# Patient Record
Sex: MALE | Race: WHITE | Employment: OTHER | URBAN - NONMETROPOLITAN AREA
[De-identification: names, ages, dates, MRNs, and addresses within clinical notes are randomized per-mention and may not be internally consistent; named-entity substitution may affect disease eponyms.]

---

## 2017-01-31 ENCOUNTER — HOSPITAL ENCOUNTER (EMERGENCY)
Facility: HOSPITAL | Age: 82
Discharge: HOME | End: 2017-01-31
Attending: PHYSICIAN ASSISTANT | Admitting: PHYSICIAN ASSISTANT
Payer: MEDICARE

## 2017-01-31 VITALS
HEART RATE: 77 BPM | HEIGHT: 72 IN | OXYGEN SATURATION: 93 % | TEMPERATURE: 100.2 F | SYSTOLIC BLOOD PRESSURE: 134 MMHG | RESPIRATION RATE: 16 BRPM | DIASTOLIC BLOOD PRESSURE: 73 MMHG

## 2017-01-31 DIAGNOSIS — J18.9 COMMUNITY ACQUIRED PNEUMONIA: Primary | ICD-10-CM

## 2017-01-31 LAB
ALBUMIN SERPL-MCNC: 3.2 G/DL (ref 3.4–5)
ALBUMIN UR-MCNC: 10 MG/DL
ALP SERPL-CCNC: 75 U/L (ref 40–150)
ALT SERPL W P-5'-P-CCNC: 18 U/L (ref 0–70)
ANION GAP SERPL CALCULATED.3IONS-SCNC: 8 MMOL/L (ref 3–14)
APPEARANCE UR: CLEAR
AST SERPL W P-5'-P-CCNC: 14 U/L (ref 0–45)
BASOPHILS # BLD AUTO: 0 10E9/L (ref 0–0.2)
BASOPHILS NFR BLD AUTO: 0.4 %
BILIRUB SERPL-MCNC: 0.4 MG/DL (ref 0.2–1.3)
BILIRUB UR QL STRIP: NEGATIVE
BUN SERPL-MCNC: 15 MG/DL (ref 7–30)
CALCIUM SERPL-MCNC: 8.5 MG/DL (ref 8.5–10.1)
CHLORIDE SERPL-SCNC: 105 MMOL/L (ref 94–109)
CO2 SERPL-SCNC: 25 MMOL/L (ref 20–32)
COLOR UR AUTO: YELLOW
CREAT SERPL-MCNC: 1.4 MG/DL (ref 0.66–1.25)
DIFFERENTIAL METHOD BLD: ABNORMAL
EOSINOPHIL # BLD AUTO: 0.1 10E9/L (ref 0–0.7)
EOSINOPHIL NFR BLD AUTO: 1 %
ERYTHROCYTE [DISTWIDTH] IN BLOOD BY AUTOMATED COUNT: 13.2 % (ref 10–15)
FLUAV+FLUBV AG SPEC QL: NEGATIVE
FLUAV+FLUBV AG SPEC QL: NORMAL
GFR SERPL CREATININE-BSD FRML MDRD: 49 ML/MIN/1.7M2
GLUCOSE SERPL-MCNC: 126 MG/DL (ref 70–99)
GLUCOSE UR STRIP-MCNC: NEGATIVE MG/DL
HCT VFR BLD AUTO: 39.9 % (ref 40–53)
HGB BLD-MCNC: 14.1 G/DL (ref 13.3–17.7)
HGB UR QL STRIP: ABNORMAL
IMM GRANULOCYTES # BLD: 0 10E9/L (ref 0–0.4)
IMM GRANULOCYTES NFR BLD: 0.6 %
KETONES UR STRIP-MCNC: NEGATIVE MG/DL
LACTATE SERPL-SCNC: 1.6 MMOL/L (ref 0.4–2)
LEUKOCYTE ESTERASE UR QL STRIP: NEGATIVE
LYMPHOCYTES # BLD AUTO: 0.8 10E9/L (ref 0.8–5.3)
LYMPHOCYTES NFR BLD AUTO: 11.4 %
MCH RBC QN AUTO: 30.5 PG (ref 26.5–33)
MCHC RBC AUTO-ENTMCNC: 35.3 G/DL (ref 31.5–36.5)
MCV RBC AUTO: 86 FL (ref 78–100)
MONOCYTES # BLD AUTO: 0.7 10E9/L (ref 0–1.3)
MONOCYTES NFR BLD AUTO: 10.6 %
MUCOUS THREADS #/AREA URNS LPF: PRESENT /LPF
NEUTROPHILS # BLD AUTO: 5.3 10E9/L (ref 1.6–8.3)
NEUTROPHILS NFR BLD AUTO: 76 %
NITRATE UR QL: NEGATIVE
NRBC # BLD AUTO: 0 10*3/UL
NRBC BLD AUTO-RTO: 0 /100
PH UR STRIP: 5.5 PH (ref 4.7–8)
PLATELET # BLD AUTO: 58 10E9/L (ref 150–450)
POTASSIUM SERPL-SCNC: 3.9 MMOL/L (ref 3.4–5.3)
PROT SERPL-MCNC: 7.3 G/DL (ref 6.8–8.8)
RBC # BLD AUTO: 4.63 10E12/L (ref 4.4–5.9)
RBC #/AREA URNS AUTO: 3 /HPF (ref 0–2)
SODIUM SERPL-SCNC: 138 MMOL/L (ref 133–144)
SP GR UR STRIP: 1.01 (ref 1–1.03)
SPECIMEN SOURCE: NORMAL
URN SPEC COLLECT METH UR: ABNORMAL
UROBILINOGEN UR STRIP-MCNC: NORMAL MG/DL (ref 0–2)
WBC # BLD AUTO: 7 10E9/L (ref 4–11)
WBC #/AREA URNS AUTO: <1 /HPF (ref 0–2)

## 2017-01-31 PROCEDURE — 80053 COMPREHEN METABOLIC PANEL: CPT | Performed by: PHYSICIAN ASSISTANT

## 2017-01-31 PROCEDURE — 85025 COMPLETE CBC W/AUTO DIFF WBC: CPT | Performed by: PHYSICIAN ASSISTANT

## 2017-01-31 PROCEDURE — 81001 URINALYSIS AUTO W/SCOPE: CPT | Performed by: PHYSICIAN ASSISTANT

## 2017-01-31 PROCEDURE — 83605 ASSAY OF LACTIC ACID: CPT | Performed by: PHYSICIAN ASSISTANT

## 2017-01-31 PROCEDURE — 99284 EMERGENCY DEPT VISIT MOD MDM: CPT | Performed by: PHYSICIAN ASSISTANT

## 2017-01-31 PROCEDURE — 36415 COLL VENOUS BLD VENIPUNCTURE: CPT | Performed by: PHYSICIAN ASSISTANT

## 2017-01-31 PROCEDURE — 99284 EMERGENCY DEPT VISIT MOD MDM: CPT | Mod: 25

## 2017-01-31 PROCEDURE — 87040 BLOOD CULTURE FOR BACTERIA: CPT | Performed by: PHYSICIAN ASSISTANT

## 2017-01-31 PROCEDURE — 87804 INFLUENZA ASSAY W/OPTIC: CPT | Mod: 59 | Performed by: FAMILY MEDICINE

## 2017-01-31 PROCEDURE — 71010 ZZHC CHEST ONE VIEW: CPT | Mod: TC

## 2017-01-31 RX ORDER — ALBUTEROL SULFATE 90 UG/1
2 AEROSOL, METERED RESPIRATORY (INHALATION) EVERY 4 HOURS PRN
Qty: 1 INHALER | Refills: 0 | Status: SHIPPED | OUTPATIENT
Start: 2017-01-31 | End: 2018-04-16

## 2017-01-31 RX ORDER — LEVOFLOXACIN 500 MG/1
500 TABLET, FILM COATED ORAL DAILY
Qty: 7 TABLET | Refills: 0 | Status: SHIPPED | OUTPATIENT
Start: 2017-01-31 | End: 2017-02-07

## 2017-01-31 ASSESSMENT — ENCOUNTER SYMPTOMS
FEVER: 1
NAUSEA: 0
ABDOMINAL PAIN: 0
CHILLS: 0
SHORTNESS OF BREATH: 0
SORE THROAT: 0
COUGH: 1
CHEST TIGHTNESS: 0
WHEEZING: 0
VOMITING: 0

## 2017-01-31 NOTE — ED NOTES
81 year old male presents  To ER with c/o fever, shaking and cough with increased symptom starting yesterday.  Did take ibuprofen PTA, also reports episodes of having episodes of feeling flushed earlier today. See assessments.  Denies pain when asked. Accompanied by wife. Call light within reach.

## 2017-01-31 NOTE — LETTER
[facility]  [address]  [place] [address]  [phone]    2017    Gino Donaldson  [address]   [place] [address]  [phone] (home)     : 1935      To Whom it may concern:    Gino Donaldson was seen in our Emergency Department today, 2017. No work for 5 days ago.     Sincerely,        Maribel Ocampo PA-C

## 2017-01-31 NOTE — ED AVS SNAPSHOT
[facility]    [address]    [place] [address]    Phone:  [phone]                                       Gino Donaldson   MRN: [id number]    Department:  [facility]   Date of Visit:  1/31/2017           After Visit Summary Signature Page     I have received my discharge instructions, and my questions have been answered. I have discussed any challenges I see with this plan with the nurse or doctor.    ..........................................................................................................................................  Patient/Patient Representative Signature      ..........................................................................................................................................  Patient Representative Print Name and Relationship to Patient    ..................................................               ................................................  Date                                            Time    ..........................................................................................................................................  Reviewed by Signature/Title    ...................................................              ..............................................  Date                                                            Time

## 2017-02-01 NOTE — ED NOTES
Discharge instructions reviewed with patient, and Rx sent to [facility]. Pt verbalized understanding. Pt ambulated with a steady gait to the exit.

## 2017-02-01 NOTE — ED NOTES
Face to face report given with opportunity to observe patient.    Report given to Brittanie BONILLA   1/31/2017  7:11 PM

## 2017-02-01 NOTE — PROGRESS NOTES
Quick Note:    Xray report routed to patient's PCP, Dr. Velásquez. Patient started on Levaquin prior to discharge. Patient instructed to f/u in 3-5 days.  ______

## 2017-02-01 NOTE — PLAN OF CARE
Pt called to request a different antibiotic that he was given after he read the potential side affects of Levaquin.  Spoke with DR. Fang and explained pt's and wife's concerns. Reassured pt/wife that only 10% of people have the side affects and risks of not taking the medicine were greater than the risk of taking the levaquin. Discussed if he noted any of the side affects to stop the medication and return to ED or F/U with his provider and to follow up if he is not getting better. Pt and wife verbalized understanding.

## 2017-02-01 NOTE — ED PROVIDER NOTES
"  History     Chief Complaint   Patient presents with     Fever     fever and cough since last night. temp 101.8 PTA. Had a flu shot     HPI  Gino Donaldson is a 81 year old male who presents with cough and fever since last night. Had a temp of 101.8 at 1530 today and took Ibuprpofen 800mg for it. His cough has been productive. Had his flu shot. Has h/o pneumonia 1 year ago per wife. 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    I have reviewed the Medications, Allergies, Past Medical and Surgical History, and Social History in the Epic system.    Review of Systems   Constitutional: Positive for fever. Negative for chills.   HENT: Negative for congestion and sore throat.    Respiratory: Positive for cough. Negative for chest tightness, shortness of breath and wheezing.    Cardiovascular: Negative for chest pain.   Gastrointestinal: Negative for nausea, vomiting and abdominal pain.   Skin: Negative.  Negative for rash.   All other systems reviewed and are negative.    Past Medical History:   Past Medical History   Diagnosis Date     Hypercholesteraemia      Nocturia      Tobacco abuse, in remission        History reviewed. No pertinent past surgical history.    Social History     Social History     Marital Status:      Spouse Name: N/A     Number of Children: N/A     Years of Education: N/A     Occupational History     Not on file.     Social History Main Topics     Smoking status: Former Smoker     Types: Cigarettes     Smokeless tobacco: Never Used     Alcohol Use: Yes     Drug Use: No     Sexual Activity: Not on file     Other Topics Concern     Not on file     Social History Narrative       Discharge Medication List as of 1/31/2017  7:10 PM      START taking these medications    Details   levofloxacin (LEVAQUIN) 500 MG tablet Take 1 tablet (500 mg) by mouth daily for 7 days, Disp-7 tablet, R-0, E-Prescribe      albuterol " (ALBUTEROL) 108 (90 BASE) MCG/ACT Inhaler Inhale 2 puffs into the lungs every 4 hours as needed for shortness of breath / dyspnea, Disp-1 Inhaler, R-0, E-Prescribe         CONTINUE these medications which have NOT CHANGED    Details   ASPIRIN ADULT LOW STRENGTH PO Take 81 mg by mouth, Historical             Allergies: Review of patient's allergies indicates no known allergies.    Physical Exam   BP: 130/68 mmHg  Pulse: 91  Heart Rate: 79  Temp: 99.8  F (37.7  C)  Resp: 20  Height: 182.9 cm (6')  SpO2: 92 %  Physical Exam   Constitutional: He is oriented to person, place, and time. He appears well-developed and well-nourished.  Non-toxic appearance. He does not have a sickly appearance. He does not appear ill. No distress.   HENT:   Head: Normocephalic and atraumatic.   Right Ear: External ear normal.   Nose: Nose normal.   Mouth/Throat: Oropharynx is clear and moist. No oropharyngeal exudate.   Eyes: Conjunctivae and EOM are normal. Pupils are equal, round, and reactive to light. Right eye exhibits no discharge. Left eye exhibits no discharge. No scleral icterus.   Neck: Normal range of motion. Neck supple. No JVD present.   Cardiovascular: Normal rate, regular rhythm, normal heart sounds and intact distal pulses.  Exam reveals no gallop and no friction rub.    No murmur heard.  Pulmonary/Chest: Effort normal and breath sounds normal. No respiratory distress. He has no wheezes. He has no rales. He exhibits no tenderness.   Crackles RLL.    Abdominal: There is no tenderness.   Musculoskeletal: Normal range of motion. He exhibits no edema.   Lymphadenopathy:     He has no cervical adenopathy.   Neurological: He is alert and oriented to person, place, and time. No cranial nerve deficit. Coordination normal.   Skin: Skin is warm and dry. No rash noted. He is not diaphoretic. No erythema. No pallor.   Psychiatric: He has a normal mood and affect. His behavior is normal. Judgment and thought content normal.   Nursing note  and vitals reviewed.      ED Course   Procedures             Labs Ordered and Resulted from Time of ED Arrival Up to the Time of Departure from the ED   CBC WITH PLATELETS DIFFERENTIAL - Abnormal; Notable for the following:     Hematocrit 39.9 (*)     Platelet Count 58 (*)     All other components within normal limits   UA MACROSCOPIC WITH REFLEX TO MICRO AND CULTURE - Abnormal; Notable for the following:     Blood Urine Trace (*)     Protein Albumin Urine 10 (*)     RBC Urine 3 (*)     Mucous Urine Present (*)     All other components within normal limits   COMPREHENSIVE METABOLIC PANEL - Abnormal; Notable for the following:     Glucose 126 (*)     Creatinine 1.40 (*)     GFR Estimate 49 (*)     GFR Estimate If Black 59 (*)     Albumin 3.2 (*)     All other components within normal limits   LACTIC ACID   VITAL SIGNS   PULSE OXIMETRY NURSING   PERIPHERAL IV CATHETER   INFLUENZA A/B ANTIGEN   BLOOD CULTURE   BLOOD CULTURE       Assessments & Plan (with Medical Decision Making)   Gino is well appearing and VS are WNL other than low grade temp of 100.2. No respiratory distress. Faint crackles to his RLL on exam. CXR shows very subtle infiltrate to the RLL. Given cough and fevers, will treat for pneumonia with Levaquin 500mg daily x 7 days and albuterol inhaler PRN coughing, dyspnea. Labs are largely unremarkable other than low platelets at 58 with h/o this and no signs of bleeding today. He was discharged home with his wife in stable condition.     Plan: Increase fluids and rest. Alternate between tylenol 650mg and Ibuprofen 600mg every 4 hours for fever control. Take the Levaquin as prescribed for your pneumonia. Use the albuterol inhaler every 4 hours as needed for wheezing, coughing, or shortness of breath. Return here with ANY new or worsening symptoms as discussed. Follow up with Dr. Velásquez on Monday for re-check. No work for 5 days.     I have reviewed the nursing notes.    I have reviewed the findings,  diagnosis, plan and need for follow up with the patient.    Discharge Medication List as of 1/31/2017  7:10 PM      START taking these medications    Details   levofloxacin (LEVAQUIN) 500 MG tablet Take 1 tablet (500 mg) by mouth daily for 7 days, Disp-7 tablet, R-0, E-Prescribe      albuterol (ALBUTEROL) 108 (90 BASE) MCG/ACT Inhaler Inhale 2 puffs into the lungs every 4 hours as needed for shortness of breath / dyspnea, Disp-1 Inhaler, R-0, E-Prescribe             Final diagnoses:   Community acquired pneumonia       1/31/2017   [facility]      Maribel Ocampo PA-C  01/31/17 2030

## 2017-02-06 ENCOUNTER — OFFICE VISIT (OUTPATIENT)
Dept: FAMILY MEDICINE | Facility: OTHER | Age: 82
End: 2017-02-06
Attending: FAMILY MEDICINE
Payer: COMMERCIAL

## 2017-02-06 VITALS
WEIGHT: 210 LBS | TEMPERATURE: 97 F | DIASTOLIC BLOOD PRESSURE: 78 MMHG | HEART RATE: 78 BPM | BODY MASS INDEX: 28.47 KG/M2 | SYSTOLIC BLOOD PRESSURE: 139 MMHG | OXYGEN SATURATION: 93 % | RESPIRATION RATE: 17 BRPM

## 2017-02-06 DIAGNOSIS — J18.9 PNEUMONIA OF BOTH LOWER LOBES DUE TO INFECTIOUS ORGANISM: Primary | ICD-10-CM

## 2017-02-06 LAB
BACTERIA SPEC CULT: NORMAL
BACTERIA SPEC CULT: NORMAL
Lab: NORMAL
Lab: NORMAL
MICRO REPORT STATUS: NORMAL
MICRO REPORT STATUS: NORMAL
SPECIMEN SOURCE: NORMAL
SPECIMEN SOURCE: NORMAL

## 2017-02-06 PROCEDURE — 99213 OFFICE O/P EST LOW 20 MIN: CPT | Performed by: FAMILY MEDICINE

## 2017-02-06 PROCEDURE — 99212 OFFICE O/P EST SF 10 MIN: CPT

## 2017-02-06 ASSESSMENT — PAIN SCALES - GENERAL: PAINLEVEL: NO PAIN (0)

## 2017-02-06 NOTE — NURSING NOTE
Chief Complaint   Patient presents with     ER F/U       Initial /78 mmHg  Pulse 78  Temp(Src) 97  F (36.1  C)  Resp 17  Wt 210 lb (95.255 kg)  SpO2 93% Estimated body mass index is 28.47 kg/(m^2) as calculated from the following:    Height as of 1/31/17: 6' (1.829 m).    Weight as of this encounter: 210 lb (95.255 kg).  Medication Reconciliation: complete

## 2017-02-06 NOTE — PATIENT INSTRUCTIONS
Preventing Pneumonia  Pneumonia is an infection in one or both of  the lungs. Those most at risk include the elderly, smokers, people with chronic lung diseases. For example, those with conditions like chronic obstructive pulmonary disease (COPD), including emphysema or asthma, and those with weak immune systems. There are some things you can do to lessen the chance that you will get pneumonia.    Avoid infection    Wash your hands often    Use soap and warm water.    If soap and water aren't available, use a hand  with alcohol in it.    Avoid touching your face and mouth with your hands.    Use disposable tissues instead of a handkerchief. Throw used tissues away.    Avoid people who have a cold or the flu.    Try to avoid crowded places.  Get vaccinated  Talk with your health care provider about vaccinations. You should get a yearly flu shot and at least one pneumococcal shot.    Get a flu vaccine every year as soon as it's available in your area. The flu shot helps prevent you from getting the flu and complications of the flu, such as pneumonia.    Get pneumococcal pneumonia vaccines. Talk with your health care provider about which pneumococcal vaccines are right for you.  Do suggested breathing exercises  Deep breathing and coughing exercises can help clear your lungs. Your health care provider may suggest them. If so, you will be shown how to do them. Do them as often as your health care provider instructs.  Take care of your body    Drink at least  6 to 8 glasses of water a day.    Eat well-balanced, nutritious meals.    Avoid drinking alcohol.    Don t smoke. Avoid places where people are smoking.    Moving around helps keep your lungs clear. Ask your health care provider what type of activity is best for you. Walking is often a good choice.    Get enough rest. Sleep at least 8 hours each night. Rest or nap during the day as needed.    9167-5819 [employer]. [address] [facility],  [place] [address]. All rights reserved. This information is not intended as a substitute for professional medical care. Always follow your healthcare professional's instructions.        Discharge Instructions for Pneumonia  You have been diagnosed with pneumonia, a serious lung infection. Most cases of pneumonia are caused by bacteria. Pneumonia most often occurs in older adults, young children, and people with chronic health problems.  Home care    Take your medication exactly as directed. Don t skip doses. Continue taking your antibiotics as directed until they are all gone -- even if you start to feel better. This will prevent the pneumonia from coming back.    Drink at least 8 glasses of water daily, unless directed otherwise. This helps to loosen and thin secretions so that you can cough them up.    Use a cool-mist humidifier in your bedroom. Be sure to clean the humidifier daily.    Coughing up mucus is normal. Don t use medications to suppress your cough unless your cough is dry, painful, or interferes with your sleep. You may use an expectorant if ordered by your doctor.    Warm compresses or a heating pad on the lowest setting can be used to relieve chest discomfort. Use several times a day for 15 to 20 minutes at a time. (To prevent injuring your skin, be sure the temperature of the compress or heating pad is warm, not hot.)    Get plenty of rest until your fever, shortness of breath, and chest pain go away.    Plan to get a flu shot every year.    Ask your doctor about pneumonia vaccinations.     Follow-up care  Make a follow-up appointment as directed by our staff.     When to seek medical care  Call 911 right away if you have any of the following:    Chest pain    Trouble breathing    Blue lips or fingernails  Otherwise, call your doctor if you have any of the following:    Fever above 101.5 F  (38.6 C)    Yellow, green, bloody, or smelly sputum    More than normal mucus production    Vomiting      5840-9365 [employer]. [address] [place] [address], [place] [address]. All rights reserved. This information is not intended as a substitute for professional medical care. Always follow your healthcare professional's instructions.

## 2017-02-06 NOTE — PROGRESS NOTES
SUBJECTIVE:                                                    Gino Donaldson is a 81 year old male who presents to clinic today for the following health issues:      ED/UC Followup:    Facility:  [facility]  Date of visit: 1/31  Reason for visit: pneumonia   Current Status: feeling better, still has a cough   Doing much better  No fevers  Cough better  Sleeping ok.           Problem list and histories reviewed & adjusted, as indicated.  Additional history: as documented    Problem list, Medication list, Allergies, and Medical/Social/Surgical histories reviewed in EPIC and updated as appropriate.    ROS:  C: NEGATIVE for fever, chills, change in weight  R: NEGATIVE for significant cough or SOB  CV: NEGATIVE for chest pain, palpitations or peripheral edema    OBJECTIVE:                                                    /78 mmHg  Pulse 78  Temp(Src) 97  F (36.1  C)  Resp 17  Wt 210 lb (95.255 kg)  SpO2 93%  Body mass index is 28.47 kg/(m^2).   GENERAL: healthy, alert, well nourished, well hydrated, no distress  HENT: ear canals- normal; TMs- normal; Nose- normal; Mouth- no ulcers, no lesions  NECK: no tenderness, no adenopathy, no asymmetry, no masses, no stiffness; thyroid- normal to palpation  RESP: lungs clear to auscultation - no rales, no rhonchi, no wheezes  CV: regular rates and rhythm, normal S1 S2, no S3 or S4 and no murmur, no click or rub -         ASSESSMENT/PLAN:                                                    (J18.9) Pneumonia of both lower lobes due to infectious organism  (primary encounter diagnosis)  Comment: clinically doing well.   Plan: F/u in 6-8 weeks for repeat CXR.  Finish abx.  Symptomatic treatment was discussed along when patient should call and/or come back into the clinic or go to ER/Urgent care. All questions answered.         See Patient Instructions    Juancho Velásquez MD  [facility]

## 2017-02-06 NOTE — MR AVS SNAPSHOT
After Visit Summary   2/6/2017    Gino Donaldson    MRN: [id number]           Patient Information     Date Of Birth          1935        Visit Information        Provider Department      2/6/2017 10:45 AM Juancho Velásquez MD [facility] [place]        Today's Diagnoses     Pneumonia of both lower lobes due to infectious organism    -  1       Care Instructions      Preventing Pneumonia  Pneumonia is an infection in one or both of  the lungs. Those most at risk include the elderly, smokers, people with chronic lung diseases. For example, those with conditions like chronic obstructive pulmonary disease (COPD), including emphysema or asthma, and those with weak immune systems. There are some things you can do to lessen the chance that you will get pneumonia.    Avoid infection    Wash your hands often    Use soap and warm water.    If soap and water aren't available, use a hand  with alcohol in it.    Avoid touching your face and mouth with your hands.    Use disposable tissues instead of a handkerchief. Throw used tissues away.    Avoid people who have a cold or the flu.    Try to avoid crowded places.  Get vaccinated  Talk with your health care provider about vaccinations. You should get a yearly flu shot and at least one pneumococcal shot.    Get a flu vaccine every year as soon as it's available in your area. The flu shot helps prevent you from getting the flu and complications of the flu, such as pneumonia.    Get pneumococcal pneumonia vaccines. Talk with your health care provider about which pneumococcal vaccines are right for you.  Do suggested breathing exercises  Deep breathing and coughing exercises can help clear your lungs. Your health care provider may suggest them. If so, you will be shown how to do them. Do them as often as your health care provider instructs.  Take care of your body    Drink at least  6 to 8 glasses of water a day.    Eat well-balanced, nutritious  meals.    Avoid drinking alcohol.    Don t smoke. Avoid places where people are smoking.    Moving around helps keep your lungs clear. Ask your health care provider what type of activity is best for you. Walking is often a good choice.    Get enough rest. Sleep at least 8 hours each night. Rest or nap during the day as needed.    4202-1854 [employer]. [address] [place] [address], [place] [address]. All rights reserved. This information is not intended as a substitute for professional medical care. Always follow your healthcare professional's instructions.        Discharge Instructions for Pneumonia  You have been diagnosed with pneumonia, a serious lung infection. Most cases of pneumonia are caused by bacteria. Pneumonia most often occurs in older adults, young children, and people with chronic health problems.  Home care    Take your medication exactly as directed. Don t skip doses. Continue taking your antibiotics as directed until they are all gone -- even if you start to feel better. This will prevent the pneumonia from coming back.    Drink at least 8 glasses of water daily, unless directed otherwise. This helps to loosen and thin secretions so that you can cough them up.    Use a cool-mist humidifier in your bedroom. Be sure to clean the humidifier daily.    Coughing up mucus is normal. Don t use medications to suppress your cough unless your cough is dry, painful, or interferes with your sleep. You may use an expectorant if ordered by your doctor.    Warm compresses or a heating pad on the lowest setting can be used to relieve chest discomfort. Use several times a day for 15 to 20 minutes at a time. (To prevent injuring your skin, be sure the temperature of the compress or heating pad is warm, not hot.)    Get plenty of rest until your fever, shortness of breath, and chest pain go away.    Plan to get a flu shot every year.    Ask your doctor about pneumonia vaccinations.     Follow-up care  Make a  "follow-up appointment as directed by our staff.     When to seek medical care  Call 911 right away if you have any of the following:    Chest pain    Trouble breathing    Blue lips or fingernails  Otherwise, call your doctor if you have any of the following:    Fever above 101.5 F  (38.6 C)    Yellow, green, bloody, or smelly sputum    More than normal mucus production    Vomiting     5071-4753 [employer]. [address] [place] [address], [place] [address]. All rights reserved. This information is not intended as a substitute for professional medical care. Always follow your healthcare professional's instructions.              Follow-ups after your visit        Who to contact     If you have questions or need follow up information about today's clinic visit or your schedule please contact [facility] directly at [phone].  Normal or non-critical lab and imaging results will be communicated to you by [facility], letter or phone within 4 business days after the clinic has received the results. If you do not hear from us within 7 days, please contact the clinic through [facility] or phone. If you have a critical or abnormal lab result, we will notify you by phone as soon as possible.  Submit refill requests through [employer] or call your pharmacy and they will forward the refill request to us. Please allow 3 business days for your refill to be completed.          Additional Information About Your Visit        [employer] Information     [employer] lets you send messages to your doctor, view your test results, renew your prescriptions, schedule appointments and more. To sign up, go to [email or web address] . Click on \"Log in\" on the left side of the screen, which will take you to the Welcome page. Then click on \"Sign up Now\" on the right side of the page.     You will be asked to enter the access code listed below, as well as some personal information. Please follow the directions to create your username and " password.     Your access code is: [email or web address]  Expires: 2017  7:08 PM     Your access code will  in 90 days. If you need help or a new code, please call your [facility] or [phone].        Care EveryWhere ID     This is your Care EveryWhere ID. This could be used by other organizations to access your [place] medical records  [id number]        Your Vitals Were     Pulse Temperature Respirations Pulse Oximetry          78 97  F (36.1  C) 17 93%         Blood Pressure from Last 3 Encounters:   17 139/78   17 134/73   16 143/81    Weight from Last 3 Encounters:   17 210 lb (95.255 kg)   07/22/15 210 lb (95.255 kg)   06/10/15 204 lb (92.534 kg)              Today, you had the following     No orders found for display       Primary Care Provider Office Phone # Fax #    Juancho Velásquez [phone]       [facility] [address] [facility] [address]        Thank you!     Thank you for choosing [facility]  for your care. Our goal is always to provide you with excellent care. Hearing back from our patients is one way we can continue to improve our services. Please take a few minutes to complete the written survey that you may receive in the mail after your visit with us. Thank you!             Your Updated Medication List - Protect others around you: Learn how to safely use, store and throw away your medicines at www.disposemymeds.org.          This list is accurate as of: 17 11:01 AM.  Always use your most recent med list.                   Brand Name Dispense Instructions for use    albuterol 108 (90 BASE) MCG/ACT Inhaler    albuterol    1 Inhaler    Inhale 2 puffs into the lungs every 4 hours as needed for shortness of breath / dyspnea       ASPIRIN ADULT LOW STRENGTH PO      Take 81 mg by mouth       levofloxacin 500 MG tablet    LEVAQUIN    7 tablet    Take 1 tablet (500 mg) by mouth daily for 7 days

## 2017-02-07 ENCOUNTER — TELEPHONE (OUTPATIENT)
Dept: FAMILY MEDICINE | Facility: OTHER | Age: 82
End: 2017-02-07

## 2017-02-07 NOTE — Clinical Note
[facility] [place]  [address]  [place] [address]  [phone]  Dept: [phone]      2/7/2017    Re: Gino Donaldson      TO WHOM IT MAY CONCERN:    Gino Donaldson  was seen on 2-6-17.  Please excuse him  until 2-8-17 due to illness. May return to work without restrictions.    Cordially        Juancho Velásquez MD  [facility] [place]

## 2017-03-20 ENCOUNTER — OFFICE VISIT (OUTPATIENT)
Dept: FAMILY MEDICINE | Facility: OTHER | Age: 82
End: 2017-03-20
Attending: FAMILY MEDICINE
Payer: MEDICARE

## 2017-03-20 ENCOUNTER — APPOINTMENT (OUTPATIENT)
Dept: GENERAL RADIOLOGY | Facility: OTHER | Age: 82
End: 2017-03-20
Attending: FAMILY MEDICINE
Payer: MEDICARE

## 2017-03-20 ENCOUNTER — APPOINTMENT (OUTPATIENT)
Dept: LAB | Facility: OTHER | Age: 82
End: 2017-03-20
Attending: FAMILY MEDICINE
Payer: MEDICARE

## 2017-03-20 VITALS
HEIGHT: 72 IN | WEIGHT: 200 LBS | SYSTOLIC BLOOD PRESSURE: 126 MMHG | TEMPERATURE: 96.8 F | DIASTOLIC BLOOD PRESSURE: 64 MMHG | BODY MASS INDEX: 27.09 KG/M2 | HEART RATE: 78 BPM

## 2017-03-20 DIAGNOSIS — J18.9 PNEUMONIA OF BOTH LOWER LOBES DUE TO INFECTIOUS ORGANISM: Primary | ICD-10-CM

## 2017-03-20 DIAGNOSIS — D69.6 THROMBOCYTOPENIA (H): ICD-10-CM

## 2017-03-20 LAB
ANION GAP SERPL CALCULATED.3IONS-SCNC: 7 MMOL/L (ref 3–14)
BASOPHILS # BLD AUTO: 0.1 10E9/L (ref 0–0.2)
BASOPHILS NFR BLD AUTO: 0.8 %
BUN SERPL-MCNC: 16 MG/DL (ref 7–30)
CALCIUM SERPL-MCNC: 8.9 MG/DL (ref 8.5–10.1)
CHLORIDE SERPL-SCNC: 109 MMOL/L (ref 94–109)
CO2 SERPL-SCNC: 24 MMOL/L (ref 20–32)
CREAT SERPL-MCNC: 0.96 MG/DL (ref 0.66–1.25)
DIFFERENTIAL METHOD BLD: ABNORMAL
EOSINOPHIL # BLD AUTO: 0.3 10E9/L (ref 0–0.7)
EOSINOPHIL NFR BLD AUTO: 3.2 %
ERYTHROCYTE [DISTWIDTH] IN BLOOD BY AUTOMATED COUNT: 13.3 % (ref 10–15)
GFR SERPL CREATININE-BSD FRML MDRD: 76 ML/MIN/1.7M2
GLUCOSE SERPL-MCNC: 103 MG/DL (ref 70–99)
HCT VFR BLD AUTO: 44.7 % (ref 40–53)
HGB BLD-MCNC: 15.4 G/DL (ref 13.3–17.7)
IMM GRANULOCYTES # BLD: 0 10E9/L (ref 0–0.4)
IMM GRANULOCYTES NFR BLD: 0.5 %
LYMPHOCYTES # BLD AUTO: 2.4 10E9/L (ref 0.8–5.3)
LYMPHOCYTES NFR BLD AUTO: 31 %
MCH RBC QN AUTO: 29.9 PG (ref 26.5–33)
MCHC RBC AUTO-ENTMCNC: 34.5 G/DL (ref 31.5–36.5)
MCV RBC AUTO: 87 FL (ref 78–100)
MONOCYTES # BLD AUTO: 0.6 10E9/L (ref 0–1.3)
MONOCYTES NFR BLD AUTO: 8.2 %
NEUTROPHILS # BLD AUTO: 4.4 10E9/L (ref 1.6–8.3)
NEUTROPHILS NFR BLD AUTO: 56.3 %
NRBC # BLD AUTO: 0 10*3/UL
NRBC BLD AUTO-RTO: 0 /100
PLATELET # BLD AUTO: 129 10E9/L (ref 150–450)
POTASSIUM SERPL-SCNC: 3.9 MMOL/L (ref 3.4–5.3)
RBC # BLD AUTO: 5.15 10E12/L (ref 4.4–5.9)
SODIUM SERPL-SCNC: 140 MMOL/L (ref 133–144)
WBC # BLD AUTO: 7.8 10E9/L (ref 4–11)

## 2017-03-20 PROCEDURE — 71020 ZZHC CHEST TWO VIEWS, FRONT/LAT: CPT | Mod: TC

## 2017-03-20 PROCEDURE — 80048 BASIC METABOLIC PNL TOTAL CA: CPT | Performed by: FAMILY MEDICINE

## 2017-03-20 PROCEDURE — 85025 COMPLETE CBC W/AUTO DIFF WBC: CPT | Performed by: FAMILY MEDICINE

## 2017-03-20 PROCEDURE — 36415 COLL VENOUS BLD VENIPUNCTURE: CPT | Performed by: FAMILY MEDICINE

## 2017-03-20 PROCEDURE — 99213 OFFICE O/P EST LOW 20 MIN: CPT | Performed by: FAMILY MEDICINE

## 2017-03-20 PROCEDURE — 99212 OFFICE O/P EST SF 10 MIN: CPT | Mod: 25

## 2017-03-20 ASSESSMENT — ANXIETY QUESTIONNAIRES
6. BECOMING EASILY ANNOYED OR IRRITABLE: NOT AT ALL
5. BEING SO RESTLESS THAT IT IS HARD TO SIT STILL: NOT AT ALL
3. WORRYING TOO MUCH ABOUT DIFFERENT THINGS: NOT AT ALL
GAD7 TOTAL SCORE: 0
1. FEELING NERVOUS, ANXIOUS, OR ON EDGE: NOT AT ALL
2. NOT BEING ABLE TO STOP OR CONTROL WORRYING: NOT AT ALL
7. FEELING AFRAID AS IF SOMETHING AWFUL MIGHT HAPPEN: NOT AT ALL

## 2017-03-20 ASSESSMENT — PATIENT HEALTH QUESTIONNAIRE - PHQ9: 5. POOR APPETITE OR OVEREATING: NOT AT ALL

## 2017-03-20 ASSESSMENT — PAIN SCALES - GENERAL: PAINLEVEL: NO PAIN (0)

## 2017-03-20 NOTE — PROGRESS NOTES
SUBJECTIVE:                                                    Gino Donaldson is a 81 year old male who presents to clinic today for the following health issues:      pnuemonia      Duration: 6 weeks    Description (location/character/radiation): no cough    Intensity:  moderate    Accompanying signs and symptoms: none    History (similar episodes/previous evaluation): None    Precipitating or alleviating factors: None    Therapies tried and outcome: follow up pneumonia     Doing well. No c/o      Problem list and histories reviewed & adjusted, as indicated.  Additional history: as documented    Labs reviewed in EPIC    Reviewed and updated as needed this visit by clinical staff  Tobacco  Allergies  Meds  Med Hx  Surg Hx  Fam Hx  Soc Hx      Reviewed and updated as needed this visit by Provider         ROS:  C: NEGATIVE for fever, chills, change in weight  E/M: NEGATIVE for ear, mouth and throat problems  R: NEGATIVE for significant cough or SOB  CV: NEGATIVE for chest pain, palpitations or peripheral edema    OBJECTIVE:                                                    /64  Pulse 78  Temp 96.8  F (36  C)  Ht 6' (1.829 m)  Wt 200 lb (90.7 kg)  BMI 27.12 kg/m2  Body mass index is 27.12 kg/(m^2).   GENERAL: healthy, alert, well nourished, well hydrated, no distress  NECK: no tenderness, no adenopathy, no asymmetry, no masses, no stiffness; thyroid- normal to palpation  RESP: lungs clear to auscultation - no rales, no rhonchi, no wheezes  CV: regular rates and rhythm, normal S1 S2, no S3 or S4 and no murmur, no click or rub -    Results for orders placed or performed in visit on 03/20/17 (from the past 24 hour(s))   CBC with platelets differential   Result Value Ref Range    WBC 7.8 4.0 - 11.0 10e9/L    RBC Count 5.15 4.4 - 5.9 10e12/L    Hemoglobin 15.4 13.3 - 17.7 g/dL    Hematocrit 44.7 40.0 - 53.0 %    MCV 87 78 - 100 fl    MCH 29.9 26.5 - 33.0 pg    MCHC 34.5 31.5 - 36.5 g/dL    RDW 13.3 10.0 -  15.0 %    Platelet Count 129 (L) 150 - 450 10e9/L    Diff Method Automated Method     % Neutrophils 56.3 %    % Lymphocytes 31.0 %    % Monocytes 8.2 %    % Eosinophils 3.2 %    % Basophils 0.8 %    % Immature Granulocytes 0.5 %    Nucleated RBCs 0 0 /100    Absolute Neutrophil 4.4 1.6 - 8.3 10e9/L    Absolute Lymphocytes 2.4 0.8 - 5.3 10e9/L    Absolute Monocytes 0.6 0.0 - 1.3 10e9/L    Absolute Eosinophils 0.3 0.0 - 0.7 10e9/L    Absolute Basophils 0.1 0.0 - 0.2 10e9/L    Abs Immature Granulocytes 0.0 0 - 0.4 10e9/L    Absolute Nucleated RBC 0.0    Basic metabolic panel   Result Value Ref Range    Sodium 140 133 - 144 mmol/L    Potassium 3.9 3.4 - 5.3 mmol/L    Chloride 109 94 - 109 mmol/L    Carbon Dioxide 24 20 - 32 mmol/L    Anion Gap 7 3 - 14 mmol/L    Glucose 103 (H) 70 - 99 mg/dL    Urea Nitrogen 16 7 - 30 mg/dL    Creatinine 0.96 0.66 - 1.25 mg/dL    GFR Estimate 76 >60 mL/min/1.7m2    GFR Estimate If Black >90   GFR Calc   >60 mL/min/1.7m2    Calcium 8.9 8.5 - 10.1 mg/dL        CXR unremarkable  ASSESSMENT/PLAN:                                                    1. Pneumonia of both lower lobes due to infectious organism  REsolved  - XR Chest 2 Views; Future  - XR Chest 2 Views    2. Creatinine elevation  Improved and back to baseline   - Basic metabolic panel; Future  - Basic metabolic panel    3. Thrombocytopenia (H)  Improved and back to baseline   - CBC with platelets differential; Future  - CBC with platelets differential      Symptomatic treatment was discussed along when patient should call and/or come back into the clinic or go to ER/Urgent care. All questions answered.       Juancho Velásquez MD  [facility]

## 2017-03-20 NOTE — NURSING NOTE
Chief Complaint   Patient presents with     Hypertension       Initial /64  Pulse 78  Temp 96.8  F (36  C)  Ht 6' (1.829 m)  Wt 200 lb (90.7 kg)  BMI 27.12 kg/m2 Estimated body mass index is 27.12 kg/(m^2) as calculated from the following:    Height as of this encounter: 6' (1.829 m).    Weight as of this encounter: 200 lb (90.7 kg).  Medication Reconciliation: complete     Josesito Cordova

## 2017-03-20 NOTE — MR AVS SNAPSHOT
"              After Visit Summary   3/20/2017    Gino Donaldson    MRN: [id number]           Patient Information     Date Of Birth          1935        Visit Information        Provider Department      3/20/2017 11:15 AM Juancho Velásquez MD [facility]        Today's Diagnoses     Pneumonia of both lower lobes due to infectious organism    -  1    Creatinine elevation        Thrombocytopenia (H)          Care Instructions    Return if problems worsen.         Follow-ups after your visit        Who to contact     If you have questions or need follow up information about today's clinic visit or your schedule please contact [facility] directly at [phone].  Normal or non-critical lab and imaging results will be communicated to you by MyChart, letter or phone within 4 business days after the clinic has received the results. If you do not hear from us within 7 days, please contact the clinic through MyChart or phone. If you have a critical or abnormal lab result, we will notify you by phone as soon as possible.  Submit refill requests through [employer] or call your pharmacy and they will forward the refill request to us. Please allow 3 business days for your refill to be completed.          Additional Information About Your Visit        MyChart Information     [employer] lets you send messages to your doctor, view your test results, renew your prescriptions, schedule appointments and more. To sign up, go to [email or web address] . Click on \"Log in\" on the left side of the screen, which will take you to the Welcome page. Then click on \"Sign up Now\" on the right side of the page.     You will be asked to enter the access code listed below, as well as some personal information. Please follow the directions to create your username and password.     Your access code is: [email or web address]  Expires: 2017  8:08 PM     Your access code will  in 90 days. If you need help or a new code, " please call your [place] clinic or [phone].        Care EveryWhere ID     This is your Care EveryWhere ID. This could be used by other organizations to access your [place] medical records  [id number]        Your Vitals Were     Pulse Temperature Height BMI (Body Mass Index)          78 96.8  F (36  C) 6' (1.829 m) 27.12 kg/m2         Blood Pressure from Last 3 Encounters:   03/20/17 126/64   02/06/17 139/78   01/31/17 134/73    Weight from Last 3 Encounters:   03/20/17 200 lb (90.7 kg)   02/06/17 210 lb (95.3 kg)   07/22/15 210 lb (95.3 kg)              We Performed the Following     Basic metabolic panel     CBC with platelets differential     XR Chest 2 Views        Primary Care Provider Office Phone # Fax #    Juancho Velásquez [phone]       [facility] [address] [facility] [address]        Thank you!     Thank you for choosing [facility]  for your care. Our goal is always to provide you with excellent care. Hearing back from our patients is one way we can continue to improve our services. Please take a few minutes to complete the written survey that you may receive in the mail after your visit with us. Thank you!             Your Updated Medication List - Protect others around you: Learn how to safely use, store and throw away your medicines at www.disposemymeds.org.          This list is accurate as of: 3/20/17 11:59 PM.  Always use your most recent med list.                   Brand Name Dispense Instructions for use    albuterol 108 (90 BASE) MCG/ACT Inhaler    albuterol    1 Inhaler    Inhale 2 puffs into the lungs every 4 hours as needed for shortness of breath / dyspnea       ASPIRIN ADULT LOW STRENGTH PO      Take 81 mg by mouth

## 2017-03-21 ASSESSMENT — PATIENT HEALTH QUESTIONNAIRE - PHQ9: SUM OF ALL RESPONSES TO PHQ QUESTIONS 1-9: 0

## 2017-03-21 ASSESSMENT — ANXIETY QUESTIONNAIRES: GAD7 TOTAL SCORE: 0

## 2017-04-12 ENCOUNTER — TELEPHONE (OUTPATIENT)
Dept: FAMILY MEDICINE | Facility: OTHER | Age: 82
End: 2017-04-12

## 2017-04-20 ENCOUNTER — HISTORY (OUTPATIENT)
Dept: UROLOGY | Facility: OTHER | Age: 82
End: 2017-04-20

## 2017-04-20 ENCOUNTER — HOSPITAL ENCOUNTER (EMERGENCY)
Facility: HOSPITAL | Age: 82
Discharge: HOME | End: 2017-04-20
Attending: EMERGENCY MEDICINE | Admitting: EMERGENCY MEDICINE
Payer: MEDICARE

## 2017-04-20 ENCOUNTER — AMBULATORY - GICH (OUTPATIENT)
Dept: UROLOGY | Facility: OTHER | Age: 82
End: 2017-04-20

## 2017-04-20 ENCOUNTER — AMBULATORY - GICH (OUTPATIENT)
Dept: SCHEDULING | Facility: OTHER | Age: 82
End: 2017-04-20

## 2017-04-20 VITALS
RESPIRATION RATE: 20 BRPM | SYSTOLIC BLOOD PRESSURE: 176 MMHG | OXYGEN SATURATION: 98 % | TEMPERATURE: 95.4 F | DIASTOLIC BLOOD PRESSURE: 95 MMHG | HEART RATE: 56 BPM

## 2017-04-20 DIAGNOSIS — N40.0 BENIGN NODULAR PROSTATIC HYPERPLASIA, PRESENCE OF LOWER URINARY TRACT SYMPTOMS UNSPECIFIED: ICD-10-CM

## 2017-04-20 DIAGNOSIS — R33.8 ACUTE URINARY RETENTION: ICD-10-CM

## 2017-04-20 LAB
ALBUMIN UR-MCNC: NEGATIVE MG/DL
APPEARANCE UR: CLEAR
BACTERIA #/AREA URNS HPF: ABNORMAL /HPF
BILIRUB UR QL STRIP: NEGATIVE
COLOR UR AUTO: ABNORMAL
GLUCOSE UR STRIP-MCNC: NEGATIVE MG/DL
HGB UR QL STRIP: ABNORMAL
KETONES UR STRIP-MCNC: NEGATIVE MG/DL
LEUKOCYTE ESTERASE UR QL STRIP: NEGATIVE
MUCOUS THREADS #/AREA URNS LPF: PRESENT /LPF
NITRATE UR QL: NEGATIVE
PH UR STRIP: 5 PH (ref 4.7–8)
RBC #/AREA URNS AUTO: 2 /HPF (ref 0–2)
SP GR UR STRIP: 1 (ref 1–1.03)
URN SPEC COLLECT METH UR: ABNORMAL
UROBILINOGEN UR STRIP-MCNC: NORMAL MG/DL (ref 0–2)
WBC #/AREA URNS AUTO: <1 /HPF (ref 0–2)

## 2017-04-20 PROCEDURE — 51702 INSERT TEMP BLADDER CATH: CPT

## 2017-04-20 PROCEDURE — 99284 EMERGENCY DEPT VISIT MOD MDM: CPT | Performed by: EMERGENCY MEDICINE

## 2017-04-20 PROCEDURE — 81001 URINALYSIS AUTO W/SCOPE: CPT | Performed by: EMERGENCY MEDICINE

## 2017-04-20 PROCEDURE — 99283 EMERGENCY DEPT VISIT LOW MDM: CPT

## 2017-04-20 PROCEDURE — 25000125 ZZHC RX 250: Performed by: EMERGENCY MEDICINE

## 2017-04-20 RX ORDER — TAMSULOSIN HYDROCHLORIDE 0.4 MG/1
0.4 CAPSULE ORAL DAILY
Qty: 10 CAPSULE | Refills: 0 | Status: SHIPPED | OUTPATIENT
Start: 2017-04-20 | End: 2017-04-30

## 2017-04-20 RX ADMIN — LIDOCAINE HYDROCHLORIDE 10 ML: 20 JELLY TOPICAL at 07:52

## 2017-04-20 ASSESSMENT — ENCOUNTER SYMPTOMS
FREQUENCY: 0
DIFFICULTY URINATING: 1
FLANK PAIN: 0

## 2017-04-20 NOTE — ED NOTES
"Patient presents to emergency room with complaints of urinary retention. Unable to void since 11pm. Very uncomfortable. Hx enlarged prostate. Denies dysuria. \"no voiding difficulties up until 11pm.\" wife at bedside. Bladder scan showed 617ml. Abdomen distended. Awake. Alert. Afebrile.   "

## 2017-04-20 NOTE — ED AVS SNAPSHOT
[facility]    [address]    [place] [address]    Phone:  [phone]                                       Gino Donaldson   MRN: [id number]    Department:  [facility]   Date of Visit:  4/20/2017           Patient Information     Date Of Birth          1935        Your diagnoses for this visit were:     Acute urinary retention     Benign nodular prostatic hyperplasia, presence of lower urinary tract symptoms unspecified        You were seen by Enrike Crain MD.      Follow-up Information     Follow up with Lawrence Rojas MD.    Specialty:  Urology    Contact information:    [facility]  [address] [facility] [address]  [phone]          Discharge Instructions       Gino,    Jose have an appointment with Dr. Lawrence Rojas in [place] on Monday, 4/24/17 at the [facility].  [place] at the Diagnostic Check-in at the clinic building [address] at 8:45 am.  Keep the catheter in until then.  Return to the emergency department in the meantime if any problems.  A prescription for Flomax (Tamsulosin) was sent to [facility].    Discharge References/Attachments     POTTER CATHETER, CARE (ENGLISH)    INDWELLING URINARY CATHETER, DISCHARGE INSTRUCTIONS (ENGLISH)    LEG BAG, DISCHARGE INSTRUCTIONS (ENGLISH)    URINARY CATHETER BAG, EMPTYING AND CLEANING YOUR (ENGLISH)         Review of your medicines      START taking        Dose / Directions Last dose taken    tamsulosin 0.4 MG capsule   Commonly known as:  FLOMAX   Dose:  0.4 mg   Quantity:  10 capsule        Take 1 capsule (0.4 mg) by mouth daily for 10 doses   Refills:  0          Our records show that you are taking the medicines listed below. If these are incorrect, please call your family doctor or clinic.        Dose / Directions Last dose taken    albuterol 108 (90 BASE) MCG/ACT Inhaler   Commonly known as:  albuterol   Dose:  2 puff   Quantity:  1  "Inhaler        Inhale 2 puffs into the lungs every 4 hours as needed for shortness of breath / dyspnea   Refills:  0        ASPIRIN ADULT LOW STRENGTH PO   Dose:  81 mg        Take 81 mg by mouth   Refills:  0                Prescriptions were sent or printed at these locations (1 Prescription)                   [facility] [address] [address]    [address] [address]    Telephone:  [phone]   Fax:  [phone]   Hours:                  E-Prescribed (1 of 1)         tamsulosin (FLOMAX) 0.4 MG capsule                Procedures and tests performed during your visit     Bladder scan    Indwelling bladder catheter (simple)    UA with Microscopic      Orders Needing Specimen Collection     None      Pending Results     No orders found from 2017 to 2017.            Pending Culture Results     No orders found from 2017 to 2017.            Thank you for choosing [facility]       Thank you for choosing [facility] for your care. Our goal is always to provide you with excellent care. Hearing back from our patients is one way we can continue to improve our services. Please take a few minutes to complete the written survey that you may receive in the mail after you visit with us. Thank you!        [employer] Information     [place] lets you send messages to your doctor, view your test results, renew your prescriptions, schedule appointments and more. To sign up, go to [email or web address] . Click on \"Log in\" on the left side of the screen, which will take you to the Welcome page. Then click on \"Sign up Now\" on the right side of the page.     You will be asked to enter the access code listed below, as well as some personal information. Please follow the directions to create your username and password.     Your access code is: [email or web address]  Expires: 2017  8:08 PM     Your access code will  in 90 days. If you need help or a new code, please call your [facility] clinic or " [phone].        Care EveryWhere ID     This is your Care EveryWhere ID. This could be used by other organizations to access your [place] medical records  [id number]        After Visit Summary       This is your record. Keep this with you and show to your community pharmacist(s) and doctor(s) at your next visit.

## 2017-04-20 NOTE — ED NOTES
Patient verbalizes understanding of discharge instructions. Denies pain. Pt verbalizes understanding of catheter care and hand outs given with instructions for reference. Leg bag placed on pt with instructions and return demonstration done with proper technique. Prescription E-prescribed to [facility]

## 2017-04-20 NOTE — DISCHARGE INSTRUCTIONS
Gino,    You have an appointment with Dr. Lawrence Rojas in [place] on Monday, 4/24/17 at the [facility].  [place] at the Diagnostic Check-in at the clinic building [address] at 8:45 am.  Keep the catheter in until then.  Return to the emergency department in the meantime if any problems.  A prescription for Flomax (Tamsulosin) was sent to [facility].

## 2017-04-20 NOTE — ED PROVIDER NOTES
I assumed care at change of shift.  Patient's urine analysis is negative for infection.  Will discharge him with indwelling daigle, an appointment is made for him with Dr. Rojas on 4/24/17 8:45.  Appointment made with Kylah Wong sent to [place].     Idalia Sun MD  04/20/17 0909

## 2017-04-20 NOTE — ED AVS SNAPSHOT
[facility]    [address]    [place] [address]    Phone:  [phone]                                       Gino Donaldson   MRN: [id number]    Department:  [facility]   Date of Visit:  4/20/2017           After Visit Summary Signature Page     I have received my discharge instructions, and my questions have been answered. I have discussed any challenges I see with this plan with the nurse or doctor.    ..........................................................................................................................................  Patient/Patient Representative Signature      ..........................................................................................................................................  Patient Representative Print Name and Relationship to Patient    ..................................................               ................................................  Date                                            Time    ..........................................................................................................................................  Reviewed by Signature/Title    ...................................................              ..............................................  Date                                                            Time

## 2017-04-20 NOTE — ED NOTES
"Funk catheter placed without difficulty with clear pale urine return. States he feels better. \"the pressure is gone.\" urine sent to lab. Education materials given to pt. Updated Dr. Crain. Dr. Crain into see pt.   "

## 2017-04-20 NOTE — ED PROVIDER NOTES
History     Chief Complaint   Patient presents with     Urinary Retention     HPI Comments: 07.23  Drove himself here, accompanied by wife    Chief complaint  Unable to void since 2 AM, suprapubic tenderness    History of present illness  81-year-old male who year ago was diagnosed with enlarged prostate, has never had any problems with urinary retention or infection and has not had any treatment required for his prostate.    His only current medication is aspirin daily although he does have albuterol for use if needed  No alcohol  No recent infection fever illness.    The history is provided by the patient, the spouse and medical records.     Past Medical History:   Diagnosis Date     Hypercholesteraemia      Nocturia      Tobacco abuse, in remission      History reviewed. No pertinent surgical history.   No Known Allergies  No current facility-administered medications for this encounter.      Current Outpatient Prescriptions   Medication     ASPIRIN ADULT LOW STRENGTH PO     albuterol (ALBUTEROL) 108 (90 BASE) MCG/ACT Inhaler         Review of Systems   Genitourinary: Positive for decreased urine volume, difficulty urinating and urgency. Negative for flank pain and frequency.       Physical Exam   BP: 176/95  Pulse: 56  Temp: (!) 95.4  F (35.2  C)  Resp: 20  SpO2: 98 %  Physical Exam   Constitutional: He is oriented to person, place, and time. He appears well-developed and well-nourished. No distress.   Pleasant alert male in some discomfort  And somewhat restless  Afebrile, elevated blood pressure  No difficulty speaking or breathing   HENT:   Head: Normocephalic.   Mouth/Throat: Oropharynx is clear and moist.   Eyes: Conjunctivae are normal. Pupils are equal, round, and reactive to light.   Cardiovascular: Normal rate.    Pulmonary/Chest: Effort normal. No respiratory distress.   Abdominal: Soft. Bowel sounds are normal. He exhibits mass (Distended bladder). He exhibits no distension. There is tenderness (next  week). There is no rebound and no guarding.   Genitourinary: Penis normal.   Neurological: He is alert and oriented to person, place, and time.   Skin: Skin is warm and dry. No rash noted. He is not diaphoretic.   Psychiatric: He has a normal mood and affect. His behavior is normal.   Nursing note and vitals reviewed.      ED Course     ED Course     Procedures        Exam  Bladder scan over 600 cc of urine  Funk with relief of discomfort, over 600 cc past so far clear urine  Urinalysis pending         Labs Ordered and Resulted from Time of ED Arrival Up to the Time of Departure from the ED   ROUTINE UA WITH MICROSCOPIC   BLADDER SCAN   ED TEMP INDWELLING BLADDER CATHETER (SIMPLE)       Assessments & Plan (with Medical Decision Making)     81-year-old male with new onset urinary retention  Requiring catheterization in emergency    I have reviewed the nursing notes.    Transfer to Dr Sun pending results and disposition    Final diagnoses:   Acute urinary retention   Benign nodular prostatic hyperplasia, presence of lower urinary tract symptoms unspecified       4/20/2017   [facility]     Enrike Crain MD  04/20/17 0848

## 2017-04-24 ENCOUNTER — HISTORY (OUTPATIENT)
Dept: UROLOGY | Facility: OTHER | Age: 82
End: 2017-04-24

## 2017-04-24 ENCOUNTER — OFFICE VISIT - GICH (OUTPATIENT)
Dept: UROLOGY | Facility: OTHER | Age: 82
End: 2017-04-24

## 2017-04-24 ENCOUNTER — TRANSFERRED RECORDS (OUTPATIENT)
Dept: HEALTH INFORMATION MANAGEMENT | Facility: HOSPITAL | Age: 82
End: 2017-04-24

## 2017-04-24 DIAGNOSIS — R33.9 RETENTION OF URINE: ICD-10-CM

## 2017-05-02 ENCOUNTER — OFFICE VISIT (OUTPATIENT)
Dept: UROLOGY | Facility: OTHER | Age: 82
End: 2017-05-02
Attending: NURSE PRACTITIONER
Payer: COMMERCIAL

## 2017-05-02 VITALS
TEMPERATURE: 97.9 F | OXYGEN SATURATION: 95 % | HEART RATE: 71 BPM | BODY MASS INDEX: 27.09 KG/M2 | SYSTOLIC BLOOD PRESSURE: 120 MMHG | DIASTOLIC BLOOD PRESSURE: 60 MMHG | HEIGHT: 72 IN | WEIGHT: 200 LBS

## 2017-05-02 DIAGNOSIS — Z71.89 ACP (ADVANCE CARE PLANNING): Chronic | ICD-10-CM

## 2017-05-02 DIAGNOSIS — N40.1 BENIGN NON-NODULAR PROSTATIC HYPERPLASIA WITH LOWER URINARY TRACT SYMPTOMS: Primary | ICD-10-CM

## 2017-05-02 PROCEDURE — 51798 US URINE CAPACITY MEASURE: CPT

## 2017-05-02 PROCEDURE — 99214 OFFICE O/P EST MOD 30 MIN: CPT | Performed by: NURSE PRACTITIONER

## 2017-05-02 PROCEDURE — 99213 OFFICE O/P EST LOW 20 MIN: CPT | Mod: 25

## 2017-05-02 RX ORDER — TAMSULOSIN HYDROCHLORIDE 0.4 MG/1
CAPSULE ORAL
COMMUNITY
Start: 2017-04-20

## 2017-05-02 ASSESSMENT — PAIN SCALES - GENERAL: PAINLEVEL: NO PAIN (0)

## 2017-05-02 NOTE — PATIENT INSTRUCTIONS
Please come back to see me in two months.  Continue to take your tamsulosin (flomax).  We will do a prostate specific antigen at that visit if you still want it. ( it may be falsely high if we do the prostate specific antigen today)    We did not do a urinalysis today, the one done on 4/20/17 was normal.    BPH (Enlarged Prostate)  The prostate is a gland at the base of the bladder. As some men get older, the prostate may get bigger in size. This problem is called benign prostatic hyperplasia (BPH). BPH puts pressure on the urethra. This is the tube that carries urine from the bladder to the penis. It may interfere with the flow of urine. It may also keep the bladder from emptying fully.    Symptoms of BPH include trouble starting urination and feeling as though the bladder isn t emptying all the way. It also includes a weak urine stream, dribbling and leaking of urine, and frequent and urgent urination (especially at night). BPH can increase the risk of urinary infections. It can also block off urine flow completely. If this occurs, a thin tube (catheter) may be passed into the bladder to help drain urine.  If symptoms are mild, no treatment may be needed right now. If symptoms are more severe, treatment is likely needed. The goal of treatment is to improve urine flow and reduce symptoms. Treatments can include medications and procedures. Your doctor will discuss treatment options with you as needed.  Home care  The following guidelines will help you care for yourself at home:    Urinate as soon as you feel the urge. Don't try to hold your urine.    Don't limit your fluid intake during the day. Drink 6 to 8 glasses of water or liquids a day. This prevents bacteria from building up in the bladder.    Avoid drinking fluids after dinner to help reduce urination during the night.    Avoid medications that can worsen your symptoms. These include certain cold and allergy medications and antidepressants. Diuretics used  for high blood pressure can also worsen symptoms. Talk to your doctor about the medications you take. Other drugs may work better for you.  Prostate cancer screening  BPH does not increase the risk of prostate cancer. But because prostate cancer is a common cancer in men, screening is sometimes recommended. This may help detect the cancer in its early stages when treatment is most effective. Factors that can increase the risk of prostate cancer include being -American or having a father or brother who had prostate cancer. A high-fat diet may also increase the risk of prostate cancer. Talk to your doctor to see whether you should be screened for prostate cancer.  Follow-up care  Follow up with your doctor or urologist as told. To learn more, go to:    National Kidney & Urologic Diseases Information Clearinghouse  kidney.niddk.nih.gov, 522.892.8512  When to seek medical care  Get prompt medical attention if any of the following occur:    Fever of 100.4 F (38.0 C) or higher, or as directed by your health care provider    Unable to pass urine for 8 hours    Increasing pressure or pain in your bladder (lower abdomen)    Blood in the urine    Increasing low back pain, not related to injury    Symptoms of urinary infection (increased urge to urinate, burning when passing urine, foul-smelling urine)    5770-6196 [employer]. [address] [place] [address], [place] [address]. All rights reserved. This information is not intended as a substitute for professional medical care. Always follow your healthcare professional's instructions.        Prostate Anatomy         The prostate gland is part of the male reproductive system. The prostate is located below the bladder. It surrounds the urethra (the tube that carries urine and semen out of the body). The function of the prostate is to produce fluid. This fluid mixes with fluid from the seminal vesicles and sperm from the testicles to form semen. During ejaculation,  semen travels through the urethra and out of the penis. Prostate health is closely linked to hormones (chemicals that carry messages throughout the body). Normal levels of hormones, such as testosterone, keep the prostate working correctly.    3929-1225 [employer]. [address] [place] [address], [place] [address]. All rights reserved. This information is not intended as a substitute for professional medical care. Always follow your healthcare professional's instructions.

## 2017-05-02 NOTE — MR AVS SNAPSHOT
After Visit Summary   5/2/2017    Gino Donaldson    MRN: [id number]           Patient Information     Date Of Birth          1935        Visit Information        Provider Department      5/2/2017 8:00 AM Brandie Song NP [facility]        Today's Diagnoses     Benign non-nodular prostatic hyperplasia with lower urinary tract symptoms    -  1    ACP (advance care planning)          Care Instructions      Please come back to see me in two months.  Continue to take your tamsulosin (flomax).  We will do a prostate specific antigen at that visit if you still want it. ( it may be falsely high if we do the prostate specific antigen today)    We did not do a urinalysis today, the one done on 4/20/17 was normal.    BPH (Enlarged Prostate)  The prostate is a gland at the base of the bladder. As some men get older, the prostate may get bigger in size. This problem is called benign prostatic hyperplasia (BPH). BPH puts pressure on the urethra. This is the tube that carries urine from the bladder to the penis. It may interfere with the flow of urine. It may also keep the bladder from emptying fully.    Symptoms of BPH include trouble starting urination and feeling as though the bladder isn t emptying all the way. It also includes a weak urine stream, dribbling and leaking of urine, and frequent and urgent urination (especially at night). BPH can increase the risk of urinary infections. It can also block off urine flow completely. If this occurs, a thin tube (catheter) may be passed into the bladder to help drain urine.  If symptoms are mild, no treatment may be needed right now. If symptoms are more severe, treatment is likely needed. The goal of treatment is to improve urine flow and reduce symptoms. Treatments can include medications and procedures. Your doctor will discuss treatment options with you as needed.  Home care  The following guidelines will help you care for yourself at  home:    Urinate as soon as you feel the urge. Don't try to hold your urine.    Don't limit your fluid intake during the day. Drink 6 to 8 glasses of water or liquids a day. This prevents bacteria from building up in the bladder.    Avoid drinking fluids after dinner to help reduce urination during the night.    Avoid medications that can worsen your symptoms. These include certain cold and allergy medications and antidepressants. Diuretics used for high blood pressure can also worsen symptoms. Talk to your doctor about the medications you take. Other drugs may work better for you.  Prostate cancer screening  BPH does not increase the risk of prostate cancer. But because prostate cancer is a common cancer in men, screening is sometimes recommended. This may help detect the cancer in its early stages when treatment is most effective. Factors that can increase the risk of prostate cancer include being -American or having a father or brother who had prostate cancer. A high-fat diet may also increase the risk of prostate cancer. Talk to your doctor to see whether you should be screened for prostate cancer.  Follow-up care  Follow up with your doctor or urologist as told. To learn more, go to:    National Kidney & Urologic Diseases Information Clearinghouse  kidney.niddk.nih.gov, 385.407.8210  When to seek medical care  Get prompt medical attention if any of the following occur:    Fever of 100.4 F (38.0 C) or higher, or as directed by your health care provider    Unable to pass urine for 8 hours    Increasing pressure or pain in your bladder (lower abdomen)    Blood in the urine    Increasing low back pain, not related to injury    Symptoms of urinary infection (increased urge to urinate, burning when passing urine, foul-smelling urine)    2491-5128 [employer]. [address] [place] [address], [place] [address]. All rights reserved. This information is not intended as a substitute for professional medical  care. Always follow your healthcare professional's instructions.        Prostate Anatomy         The prostate gland is part of the male reproductive system. The prostate is located below the bladder. It surrounds the urethra (the tube that carries urine and semen out of the body). The function of the prostate is to produce fluid. This fluid mixes with fluid from the seminal vesicles and sperm from the testicles to form semen. During ejaculation, semen travels through the urethra and out of the penis. Prostate health is closely linked to hormones (chemicals that carry messages throughout the body). Normal levels of hormones, such as testosterone, keep the prostate working correctly.    7821-2608 [employer]. [address] [place] [address]. All rights reserved. This information is not intended as a substitute for professional medical care. Always follow your healthcare professional's instructions.              Follow-ups after your visit        Who to contact     If you have questions or need follow up information about today's clinic visit or your schedule please contact [facility] directly at [phone].  Normal or non-critical lab and imaging results will be communicated to you by Mobbleshart, letter or phone within 4 business days after the clinic has received the results. If you do not hear from us within 7 days, please contact the clinic through [employer] or phone. If you have a critical or abnormal lab result, we will notify you by phone as soon as possible.  Submit refill requests through [employer] or call your pharmacy and they will forward the refill request to us. Please allow 3 business days for your refill to be completed.          Additional Information About Your Visit        [employer] Information     Katango lets you send messages to your doctor, view your test results, renew your prescriptions, schedule appointments and more. To sign up, go to [email or web address] .  "Click on \"Log in\" on the left side of the screen, which will take you to the Welcome page. Then click on \"Sign up Now\" on the right side of the page.     You will be asked to enter the access code listed below, as well as some personal information. Please follow the directions to create your username and password.     Your access code is: [email or web address]  Expires: 2017  8:14 AM     Your access code will  in 90 days. If you need help or a new code, please call your [facility] clinic or [phone].        Care EveryWhere ID     This is your Care EveryWhere ID. This could be used by other organizations to access your [facility] medical records  [id number]        Your Vitals Were     Pulse Temperature Height Pulse Oximetry BMI (Body Mass Index)       71 97.9  F (36.6  C) (Tympanic) 1.829 m (6') 95% 27.12 kg/m2        Blood Pressure from Last 3 Encounters:   17 120/60   17 176/95   17 126/64    Weight from Last 3 Encounters:   17 90.7 kg (200 lb)   17 90.7 kg (200 lb)   17 95.3 kg (210 lb)              Today, you had the following     No orders found for display       Primary Care Provider Office Phone # Fax #    Juancho Velásquez [phone]       [address] [address]        Thank you!     Thank you for choosing [facility]  for your care. Our goal is always to provide you with excellent care. Hearing back from our patients is one way we can continue to improve our services. Please take a few minutes to complete the written survey that you may receive in the mail after your visit with us. Thank you!             Your Updated Medication List - Protect others around you: Learn how to safely use, store and throw away your medicines at www.disposemymeds.org.          This list is accurate as of: 17  8:29 AM.  Always use your most recent med list.                   Brand Name Dispense Instructions for use    " albuterol 108 (90 BASE) MCG/ACT Inhaler    albuterol    1 Inhaler    Inhale 2 puffs into the lungs every 4 hours as needed for shortness of breath / dyspnea       ASPIRIN ADULT LOW STRENGTH PO      Take 81 mg by mouth       tamsulosin 0.4 MG capsule    FLOMAX

## 2017-05-02 NOTE — NURSING NOTE
Chief Complaint   Patient presents with     Urinary Retention     Had urinary retention 2 weeeks ago, went to ER, had catheter put in, went to see Dr Rojas, had catheter taken out.     *_* Living Will *_*     has paperwork at home.       Initial /60 (BP Location: Left arm, Patient Position: Chair, Cuff Size: Adult Regular)  Pulse 71  Temp 97.9  F (36.6  C) (Tympanic)  Ht 1.829 m (6')  Wt 90.7 kg (200 lb)  SpO2 95%  BMI 27.12 kg/m2 Estimated body mass index is 27.12 kg/(m^2) as calculated from the following:    Height as of this encounter: 1.829 m (6').    Weight as of this encounter: 90.7 kg (200 lb).  Medication Reconciliation: complete   Chema Watters LPN

## 2017-05-02 NOTE — PROGRESS NOTES
Patient is seen in consultation for referring ER provider and PCP Dr. Velásquez    CC: urinary retention.    HPI: Mr. Gino Donaldson is a very pleasant 81 year old male seen today in the urology clinic for urinary retention.     He woke up in the middle of the night on 4/20/17 and was very uncomfortable and could not void.  He was catheterized, with 600 ml noted.  He went to see Dr. Rojas on 4/24/17 and was prescribed tamsulosin.  He also had a trial void completed, with 23 ml noted as a post void residual.  The catheter was left out.  He is here today for a repeat post void residual and evaluation.  He would like more information regarding benign prostatic hypertrophy.     Nocturia: 1 times per night.  Frequency: 3-4 times per day.  Urgency: no.  Urge Incontinence: no.  Force of stream: Subjectively pretty good.  Gross Hematuria: no.  Dysuria: no.  Straining: no.    Crede maneuver: no.  Sensation of incomplete bladder emptying: no.  Post-void dribbling: no.  Hesitancy: no.  Intermittency: no.    AUA Score:5, quality of life score is 2    Past Medical History:   Diagnosis Date     Hypercholesteraemia      Nocturia      Tobacco abuse, in remission      History reviewed. No pertinent surgical history.  Current Outpatient Prescriptions   Medication     tamsulosin (FLOMAX) 0.4 MG capsule     ASPIRIN ADULT LOW STRENGTH PO     albuterol (ALBUTEROL) 108 (90 BASE) MCG/ACT Inhaler     No current facility-administered medications for this visit.      No Known Allergies  Family History: There is no family h/o  malignancy.  There is no family h/o urolithiasis.     Social History: The patient does not smoke cigarettes (quit 3 years ago- did smoke for about 20 years), social ETOH and no illicit drug use.  He is .    Review Of Systems  Skin: negative  Eyes: glasses  Ears/Nose/Throat: negative  Respiratory: No shortness of breath, dyspnea on exertion, cough, or hemoptysis  Cardiovascular: negative  Gastrointestinal:  negative  Genitourinary: as above  Musculoskeletal: negative  Neurologic: negative  Psychiatric: negative  Hematologic/Lymphatic/Immunologic: thrombocytopenia  Endocrine: negative    PHYSICAL EXAM:   Vitals:    05/02/17 0746   BP: 120/60   BP Location: Left arm   Patient Position: Chair   Cuff Size: Adult Regular   Pulse: 71   Temp: 97.9  F (36.6  C)   TempSrc: Tympanic   SpO2: 95%   Weight: 90.7 kg (200 lb)   Height: 1.829 m (6')     GENERAL: Well groomed, well developed, well nourished male in no acute distress.  HEENT: extra ocular movements intact, atraumatic,  normocephalic.  SKIN: Warm to touch, dry.  No visible rashes or lesions on examined areas.  RESP: No increased respiratory effort. Lungs clear to ausculation bilateral.  CV: Rate and rhythm regular, no murmurs/rubs/gallops.  LYMPH: No lower extremity edema.  ABD: Soft, non tender, non distended, no palpable masses.  Normoactive bowel sounds.  No CVA tenderness bilaterally.  MS: Full range of motion  in extremities.  GISSELLE:     Normal rectal tone, no amount of stool in the rectal vault.     Enlarged sized prostate, no tenderness, mass or asymmetry.   NEURO: Alert and oriented x 3.  PSYCH: Normal mood and affect, pleasant and agreeable during interview and exam.    PVR: Postvoid residual urine by ultrasound was 110 ml.      Admission on 04/20/2017, Discharged on 04/20/2017   Component Date Value Ref Range Status     Color Urine 04/20/2017 Light Yellow   Final     Appearance Urine 04/20/2017 Clear   Final     Glucose Urine 04/20/2017 Negative  NEG mg/dL Final     Bilirubin Urine 04/20/2017 Negative  NEG Final     Ketones Urine 04/20/2017 Negative  NEG mg/dL Final     Specific Gravity Urine 04/20/2017 1.005  1.003 - 1.035 Final     Blood Urine 04/20/2017 Trace* NEG Final     pH Urine 04/20/2017 5.0  4.7 - 8.0 pH Final     Protein Albumin Urine 04/20/2017 Negative  NEG mg/dL Final     Urobilinogen mg/dL 04/20/2017 Normal  0.0 - 2.0 mg/dL Final     Nitrite Urine  04/20/2017 Negative  NEG Final     Leukocyte Esterase Urine 04/20/2017 Negative  NEG Final     Source 04/20/2017 Midstream Urine   Final     WBC Urine 04/20/2017 <1  0 - 2 /HPF Final     RBC Urine 04/20/2017 2  0 - 2 /HPF Final     Bacteria Urine 04/20/2017 None* NEG /HPF Final     Mucous Urine 04/20/2017 Present* NEG /LPF Final       ASSESSMENT/PLAN:  Mr. Gino Donaldson is a very pleasant 81 year old male with a history of benign prostatic hypertrophy with lower urinary tract symptoms.  He will continue to take his flomax.  He will return in two months for another post void residual because it is increasing and I would like to keep an eye on it.    He has asked for a prostate specific antigen test.  We did not do one today because of his recent catheterization and the GISSELLE today, it may be falsely elevated.  He is OK with doing it at his next visit.  His wife is quite determined that he have the test.  We did discuss the utility of prostate specific antigen testing, both the positives and negatives.       I have enjoyed participating in the medical care of this very pleasant patient.  Using layterms, and diagrams when needed, I explained the pathophysiology, usual course, potential complications, recommended monitoring, preventive modalities, treatment rationale, risks, and benefits to Mr. Gino Donaldson. The patient appeared to understand and all questions were satisfactorily answered.      Brandie Song  [facility]  Urology  May 2, 2017

## 2017-07-11 ENCOUNTER — OFFICE VISIT (OUTPATIENT)
Dept: UROLOGY | Facility: OTHER | Age: 82
End: 2017-07-11
Attending: NURSE PRACTITIONER
Payer: MEDICARE

## 2017-07-11 VITALS
WEIGHT: 200 LBS | BODY MASS INDEX: 27.09 KG/M2 | TEMPERATURE: 96.2 F | SYSTOLIC BLOOD PRESSURE: 130 MMHG | HEART RATE: 66 BPM | HEIGHT: 72 IN | DIASTOLIC BLOOD PRESSURE: 80 MMHG | OXYGEN SATURATION: 93 %

## 2017-07-11 DIAGNOSIS — N40.0 BENIGN PROSTATIC HYPERPLASIA, PRESENCE OF LOWER URINARY TRACT SYMPTOMS UNSPECIFIED: Primary | ICD-10-CM

## 2017-07-11 LAB — PSA SERPL-MCNC: 4.95 UG/L (ref 0–4)

## 2017-07-11 PROCEDURE — 99212 OFFICE O/P EST SF 10 MIN: CPT | Mod: 25 | Performed by: NURSE PRACTITIONER

## 2017-07-11 PROCEDURE — 84153 ASSAY OF PSA TOTAL: CPT | Mod: ZL | Performed by: NURSE PRACTITIONER

## 2017-07-11 PROCEDURE — 36415 COLL VENOUS BLD VENIPUNCTURE: CPT | Mod: ZL | Performed by: NURSE PRACTITIONER

## 2017-07-11 PROCEDURE — 99213 OFFICE O/P EST LOW 20 MIN: CPT | Performed by: NURSE PRACTITIONER

## 2017-07-11 PROCEDURE — 51798 US URINE CAPACITY MEASURE: CPT

## 2017-07-11 NOTE — MR AVS SNAPSHOT
"              After Visit Summary   7/11/2017    Gino Donaldson    MRN: [id number]           Patient Information     Date Of Birth          1935        Visit Information        Provider Department      7/11/2017 8:30 AM Brandie Song NP [facility]        Today's Diagnoses     Benign prostatic hyperplasia, presence of lower urinary tract symptoms unspecified    -  1       Follow-ups after your visit        Your next 10 appointments already scheduled     Jul 10, 2018  8:00 AM CDT   (Arrive by 7:45 AM)   Return Visit with Brandie Song NP   [facility]    [address]  [facility] [address]   [phone]              Who to contact     If you have questions or need follow up information about today's clinic visit or your schedule please contact [facility] directly at [phone].  Normal or non-critical lab and imaging results will be communicated to you by MyChart, letter or phone within 4 business days after the clinic has received the results. If you do not hear from us within 7 days, please contact the clinic through MyChart or phone. If you have a critical or abnormal lab result, we will notify you by phone as soon as possible.  Submit refill requests through [employer] or call your pharmacy and they will forward the refill request to us. Please allow 3 business days for your refill to be completed.          Additional Information About Your Visit        MyChart Information     [employer] lets you send messages to your doctor, view your test results, renew your prescriptions, schedule appointments and more. To sign up, go to [email or web address] . Click on \"Log in\" on the left side of the screen, which will take you to the Welcome page. Then click on \"Sign up Now\" on the right side of the page.     You will be asked to enter the access code listed below, as well as some personal information. Please follow the directions to " create your username and password.     Your access code is: [email or web address]  Expires: 2017  8:14 AM     Your access code will  in 90 days. If you need help or a new code, please call your [facility] or [phone].        Care EveryWhere ID     This is your Care EveryWhere ID. This could be used by other organizations to access your [place] medical records  [id number]        Your Vitals Were     Pulse Temperature Height Pulse Oximetry BMI (Body Mass Index)       66 96.2  F (35.7  C) (Tympanic) 1.829 m (6') 93% 27.12 kg/m2        Blood Pressure from Last 3 Encounters:   17 130/80   17 120/60   17 176/95    Weight from Last 3 Encounters:   17 90.7 kg (200 lb)   17 90.7 kg (200 lb)   17 90.7 kg (200 lb)              We Performed the Following     PSA Diagnostic        Primary Care Provider Office Phone # Fax #    Juancho Velásquez [phone]       [facility] [address]  [facility] [address]        Equal Access to Services     MARY LOVE AH: Hadii aracelis bergeron hadasho Soomaali, waaxda luqadaha, qaybta kaalmada adeegyada, margaret zamudio. So [facility] [phone].    ATENCIÓN: Si habla español, tiene a melvin disposición servicios gratuitos de asistencia lingüística. Llame al [phone].    We comply with applicable federal civil rights laws and [place] laws. We do not discriminate on the basis of race, color, national origin, age, disability sex, sexual orientation or gender identity.            Thank you!     Thank you for choosing [facility]  for your care. Our goal is always to provide you with excellent care. Hearing back from our patients is one way we can continue to improve our services. Please take a few minutes to complete the written survey that you may receive in the mail after your visit with us. Thank you!             Your Updated Medication List - Protect others around you: Learn how to safely  use, store and throw away your medicines at www.disposemymeds.org.          This list is accurate as of: 7/11/17  9:35 AM.  Always use your most recent med list.                   Brand Name Dispense Instructions for use Diagnosis    albuterol 108 (90 BASE) MCG/ACT Inhaler    albuterol    1 Inhaler    Inhale 2 puffs into the lungs every 4 hours as needed for shortness of breath / dyspnea        ASPIRIN ADULT LOW STRENGTH PO      Take 81 mg by mouth        tamsulosin 0.4 MG capsule    FLOMAX

## 2017-07-11 NOTE — PROGRESS NOTES
CC: urinary retention, benign prostatic hypertrophy with lower urinary tract symptoms    HPI: Mr. Gino Donaldson is a very pleasant 81 year old male seen today in the urology clinic for follow up of benign prostatic hypertrophy.  He was catheterized on 4/20/17 because of a post void residual of 600 ml.  He was started on tamsulosin and had his catheter removed on 4/24/17.  At his last visit with me two months ago his post void residual was 110 ml.  I asked him to return today for a post void residual, rather than waiting an entire year, to make sure his post void residual is not gradually increasing.    Nocturia: 1 times per night.  Frequency: 3-4 times per day.  Urgency: no.  Urge Incontinence: no.  Force of stream: Subjectively pretty good.  Gross Hematuria: no.  Dysuria: no.  Straining: no.    Crede maneuver: no.  Sensation of incomplete bladder emptying: no.  Post-void dribbling: no.  Hesitancy: no.  Intermittency: no.    AUA Score:2, quality of life score is 1 (was 5/2)    Past Medical History:   Diagnosis Date     Hypercholesteraemia      Nocturia      Tobacco abuse, in remission      No past surgical history on file.  Current Outpatient Prescriptions   Medication     tamsulosin (FLOMAX) 0.4 MG capsule     ASPIRIN ADULT LOW STRENGTH PO     albuterol (ALBUTEROL) 108 (90 BASE) MCG/ACT Inhaler     No current facility-administered medications for this visit.      No Known Allergies  Family History: There is no family h/o  malignancy.  There is no family h/o urolithiasis.     Social History: The patient does not smoke cigarettes (quit 3 years ago- did smoke for about 20 years), social ETOH and no illicit drug use.  He is .    Review Of Systems  Skin: negative  Eyes: glasses  Ears/Nose/Throat: negative  Respiratory: No shortness of breath, dyspnea on exertion, cough, or hemoptysis  Cardiovascular: negative  Gastrointestinal: negative  Genitourinary: as above  Musculoskeletal: negative  Neurologic:  negative  Psychiatric: negative  Hematologic/Lymphatic/Immunologic: thrombocytopenia  Endocrine: negative    PHYSICAL EXAM:   Vitals:    07/11/17 0813   BP: 130/80   BP Location: Right arm   Cuff Size: Adult Regular   Pulse: 66   Temp: 96.2  F (35.7  C)   TempSrc: Tympanic   SpO2: 93%   Weight: 90.7 kg (200 lb)   Height: 1.829 m (6')     GENERAL: Well groomed, well developed, well nourished male in no acute distress.  HEENT: extra ocular movements intact, atraumatic,  normocephalic.  SKIN: Warm to touch, dry.  No visible rashes or lesions on examined areas.  RESP: No increased respiratory effort. Lungs clear to ausculation bilateral.  CV: Rate and rhythm regular, no murmurs/rubs/gallops.  LYMPH: No lower extremity edema.  ABD: Soft, non tender, non distended, no palpable masses.  Normoactive bowel sounds.  No CVA tenderness bilaterally.  MS: Full range of motion  in extremities.  GISSELLE: Deferred, done at last visit    NEURO: Alert and oriented x 3.  PSYCH: Normal mood and affect, pleasant and agreeable during interview and exam.    PVR: Postvoid residual urine by ultrasound was 48 ml.      Admission on 04/20/2017, Discharged on 04/20/2017   Component Date Value Ref Range Status     Color Urine 04/20/2017 Light Yellow   Final     Appearance Urine 04/20/2017 Clear   Final     Glucose Urine 04/20/2017 Negative  NEG mg/dL Final     Bilirubin Urine 04/20/2017 Negative  NEG Final     Ketones Urine 04/20/2017 Negative  NEG mg/dL Final     Specific Gravity Urine 04/20/2017 1.005  1.003 - 1.035 Final     Blood Urine 04/20/2017 Trace* NEG Final     pH Urine 04/20/2017 5.0  4.7 - 8.0 pH Final     Protein Albumin Urine 04/20/2017 Negative  NEG mg/dL Final     Urobilinogen mg/dL 04/20/2017 Normal  0.0 - 2.0 mg/dL Final     Nitrite Urine 04/20/2017 Negative  NEG Final     Leukocyte Esterase Urine 04/20/2017 Negative  NEG Final     Source 04/20/2017 Midstream Urine   Final     WBC Urine 04/20/2017 <1  0 - 2 /HPF Final     RBC Urine  04/20/2017 2  0 - 2 /HPF Final     Bacteria Urine 04/20/2017 None* NEG /HPF Final     Mucous Urine 04/20/2017 Present* NEG /LPF Final       ASSESSMENT/PLAN:  Mr. Gino Donaldson is a very pleasant 81 year old male with a history of benign prostatic hypertrophy with lower urinary tract symptoms.  He will continue to take his flomax.    He will see me again in a year, or sooner if he has any issues.    He will have his prostate specific antigen done today.  His wife is particularly concerned that his prostate specific antigen is done.    I have enjoyed participating in the medical care of this very pleasant patient.  Using layterms, and diagrams when needed, I explained the pathophysiology, usual course, potential complications, recommended monitoring, preventive modalities, treatment rationale, risks, and benefits to Mr. Gino Donaldson. The patient appeared to understand and all questions were satisfactorily answered.      Brandie Song  [facility]  Urology  July 11, 2017

## 2017-12-12 ENCOUNTER — APPOINTMENT (OUTPATIENT)
Dept: GENERAL RADIOLOGY | Facility: HOSPITAL | Age: 82
End: 2017-12-12
Payer: MEDICARE

## 2017-12-12 ENCOUNTER — HOSPITAL ENCOUNTER (EMERGENCY)
Facility: HOSPITAL | Age: 82
Discharge: HOME | End: 2017-12-12
Payer: MEDICARE

## 2017-12-12 VITALS
TEMPERATURE: 99.3 F | HEART RATE: 98 BPM | DIASTOLIC BLOOD PRESSURE: 66 MMHG | RESPIRATION RATE: 20 BRPM | SYSTOLIC BLOOD PRESSURE: 113 MMHG | OXYGEN SATURATION: 93 %

## 2017-12-12 DIAGNOSIS — R31.0 GROSS HEMATURIA: ICD-10-CM

## 2017-12-12 DIAGNOSIS — J18.9 COMMUNITY ACQUIRED PNEUMONIA, UNSPECIFIED LATERALITY: Primary | ICD-10-CM

## 2017-12-12 LAB
ALBUMIN SERPL-MCNC: 3.4 G/DL (ref 3.4–5)
ALBUMIN UR-MCNC: 30 MG/DL
ALP SERPL-CCNC: 81 U/L (ref 40–150)
ALT SERPL W P-5'-P-CCNC: 18 U/L (ref 0–70)
ANION GAP SERPL CALCULATED.3IONS-SCNC: 6 MMOL/L (ref 3–14)
APPEARANCE UR: CLEAR
AST SERPL W P-5'-P-CCNC: 15 U/L (ref 0–45)
BACTERIA #/AREA URNS HPF: ABNORMAL /HPF
BASOPHILS # BLD AUTO: 0 10E9/L (ref 0–0.2)
BASOPHILS # BLD AUTO: 0.1 10E9/L (ref 0–0.2)
BASOPHILS NFR BLD AUTO: 0.2 %
BASOPHILS NFR BLD AUTO: 0.2 %
BILIRUB SERPL-MCNC: 0.8 MG/DL (ref 0.2–1.3)
BILIRUB UR QL STRIP: NEGATIVE
BUN SERPL-MCNC: 16 MG/DL (ref 7–30)
CALCIUM SERPL-MCNC: 8.7 MG/DL (ref 8.5–10.1)
CHLORIDE SERPL-SCNC: 102 MMOL/L (ref 94–109)
CO2 SERPL-SCNC: 25 MMOL/L (ref 20–32)
COLOR UR AUTO: YELLOW
CREAT SERPL-MCNC: 1.11 MG/DL (ref 0.66–1.25)
DIFFERENTIAL METHOD BLD: ABNORMAL
DIFFERENTIAL METHOD BLD: ABNORMAL
EOSINOPHIL # BLD AUTO: 0 10E9/L (ref 0–0.7)
EOSINOPHIL # BLD AUTO: 0 10E9/L (ref 0–0.7)
EOSINOPHIL NFR BLD AUTO: 0 %
EOSINOPHIL NFR BLD AUTO: 0 %
ERYTHROCYTE [DISTWIDTH] IN BLOOD BY AUTOMATED COUNT: 13.2 % (ref 10–15)
ERYTHROCYTE [DISTWIDTH] IN BLOOD BY AUTOMATED COUNT: 13.2 % (ref 10–15)
FLUAV+FLUBV AG SPEC QL: NEGATIVE
FLUAV+FLUBV AG SPEC QL: NEGATIVE
GFR SERPL CREATININE-BSD FRML MDRD: 64 ML/MIN/1.7M2
GLUCOSE SERPL-MCNC: 165 MG/DL (ref 70–99)
GLUCOSE UR STRIP-MCNC: NEGATIVE MG/DL
HCT VFR BLD AUTO: 37.9 % (ref 40–53)
HCT VFR BLD AUTO: 41.4 % (ref 40–53)
HGB BLD-MCNC: 13.5 G/DL (ref 13.3–17.7)
HGB BLD-MCNC: 14.7 G/DL (ref 13.3–17.7)
HGB UR QL STRIP: ABNORMAL
HYALINE CASTS #/AREA URNS LPF: 4 /LPF
IMM GRANULOCYTES # BLD: 0.2 10E9/L (ref 0–0.4)
IMM GRANULOCYTES # BLD: 0.2 10E9/L (ref 0–0.4)
IMM GRANULOCYTES NFR BLD: 0.7 %
IMM GRANULOCYTES NFR BLD: 0.7 %
KETONES UR STRIP-MCNC: 5 MG/DL
LACTATE SERPL-SCNC: 1.7 MMOL/L (ref 0.4–2)
LACTATE SERPL-SCNC: 2.4 MMOL/L (ref 0.4–2)
LEUKOCYTE ESTERASE UR QL STRIP: NEGATIVE
LYMPHOCYTES # BLD AUTO: 0.8 10E9/L (ref 0.8–5.3)
LYMPHOCYTES # BLD AUTO: 0.9 10E9/L (ref 0.8–5.3)
LYMPHOCYTES NFR BLD AUTO: 3 %
LYMPHOCYTES NFR BLD AUTO: 3.6 %
MCH RBC QN AUTO: 30.6 PG (ref 26.5–33)
MCH RBC QN AUTO: 30.8 PG (ref 26.5–33)
MCHC RBC AUTO-ENTMCNC: 35.5 G/DL (ref 31.5–36.5)
MCHC RBC AUTO-ENTMCNC: 35.6 G/DL (ref 31.5–36.5)
MCV RBC AUTO: 86 FL (ref 78–100)
MCV RBC AUTO: 87 FL (ref 78–100)
MONOCYTES # BLD AUTO: 1.3 10E9/L (ref 0–1.3)
MONOCYTES # BLD AUTO: 1.3 10E9/L (ref 0–1.3)
MONOCYTES NFR BLD AUTO: 5 %
MONOCYTES NFR BLD AUTO: 5.2 %
MUCOUS THREADS #/AREA URNS LPF: PRESENT /LPF
NEUTROPHILS # BLD AUTO: 22.4 10E9/L (ref 1.6–8.3)
NEUTROPHILS # BLD AUTO: 23.7 10E9/L (ref 1.6–8.3)
NEUTROPHILS NFR BLD AUTO: 90.3 %
NEUTROPHILS NFR BLD AUTO: 91.1 %
NITRATE UR QL: NEGATIVE
NRBC # BLD AUTO: 0 10*3/UL
NRBC # BLD AUTO: 0 10*3/UL
NRBC BLD AUTO-RTO: 0 /100
NRBC BLD AUTO-RTO: 0 /100
PH UR STRIP: 5.5 PH (ref 4.7–8)
PLATELET # BLD AUTO: 64 10E9/L (ref 150–450)
PLATELET # BLD AUTO: 66 10E9/L (ref 150–450)
POTASSIUM SERPL-SCNC: 3.9 MMOL/L (ref 3.4–5.3)
PROT SERPL-MCNC: 7.9 G/DL (ref 6.8–8.8)
RBC # BLD AUTO: 4.38 10E12/L (ref 4.4–5.9)
RBC # BLD AUTO: 4.81 10E12/L (ref 4.4–5.9)
RBC #/AREA URNS AUTO: 8 /HPF (ref 0–2)
SODIUM SERPL-SCNC: 133 MMOL/L (ref 133–144)
SOURCE: ABNORMAL
SP GR UR STRIP: 1.02 (ref 1–1.03)
SPECIMEN SOURCE: NORMAL
UROBILINOGEN UR STRIP-MCNC: NORMAL MG/DL (ref 0–2)
WBC # BLD AUTO: 24.8 10E9/L (ref 4–11)
WBC # BLD AUTO: 26 10E9/L (ref 4–11)
WBC #/AREA URNS AUTO: 2 /HPF (ref 0–2)

## 2017-12-12 PROCEDURE — 87804 INFLUENZA ASSAY W/OPTIC: CPT | Mod: 59 | Performed by: FAMILY MEDICINE

## 2017-12-12 PROCEDURE — 36415 COLL VENOUS BLD VENIPUNCTURE: CPT

## 2017-12-12 PROCEDURE — 71020 XR CHEST 2 VW: CPT | Mod: TC

## 2017-12-12 PROCEDURE — 96375 TX/PRO/DX INJ NEW DRUG ADDON: CPT

## 2017-12-12 PROCEDURE — 96365 THER/PROPH/DIAG IV INF INIT: CPT

## 2017-12-12 PROCEDURE — 80053 COMPREHEN METABOLIC PANEL: CPT

## 2017-12-12 PROCEDURE — 25000128 H RX IP 250 OP 636

## 2017-12-12 PROCEDURE — 85025 COMPLETE CBC W/AUTO DIFF WBC: CPT

## 2017-12-12 PROCEDURE — 87040 BLOOD CULTURE FOR BACTERIA: CPT

## 2017-12-12 PROCEDURE — 83605 ASSAY OF LACTIC ACID: CPT | Mod: 91

## 2017-12-12 PROCEDURE — 99284 EMERGENCY DEPT VISIT MOD MDM: CPT

## 2017-12-12 PROCEDURE — 81001 URINALYSIS AUTO W/SCOPE: CPT

## 2017-12-12 PROCEDURE — 99284 EMERGENCY DEPT VISIT MOD MDM: CPT | Mod: 25

## 2017-12-12 PROCEDURE — 25000132 ZZH RX MED GY IP 250 OP 250 PS 637: Mod: GY

## 2017-12-12 PROCEDURE — 96361 HYDRATE IV INFUSION ADD-ON: CPT

## 2017-12-12 PROCEDURE — A9270 NON-COVERED ITEM OR SERVICE: HCPCS | Mod: GY

## 2017-12-12 RX ORDER — ONDANSETRON 2 MG/ML
4 INJECTION INTRAMUSCULAR; INTRAVENOUS EVERY 30 MIN PRN
Status: DISCONTINUED | OUTPATIENT
Start: 2017-12-12 | End: 2017-12-13 | Stop reason: HOSPADM

## 2017-12-12 RX ORDER — SODIUM CHLORIDE 9 MG/ML
1000 INJECTION, SOLUTION INTRAVENOUS CONTINUOUS
Status: DISCONTINUED | OUTPATIENT
Start: 2017-12-12 | End: 2017-12-13 | Stop reason: HOSPADM

## 2017-12-12 RX ORDER — LEVOFLOXACIN 5 MG/ML
750 INJECTION, SOLUTION INTRAVENOUS ONCE
Status: COMPLETED | OUTPATIENT
Start: 2017-12-12 | End: 2017-12-12

## 2017-12-12 RX ORDER — ACETAMINOPHEN 325 MG/1
975 TABLET ORAL ONCE
Status: COMPLETED | OUTPATIENT
Start: 2017-12-12 | End: 2017-12-12

## 2017-12-12 RX ORDER — LEVOFLOXACIN 750 MG/1
750 TABLET, FILM COATED ORAL DAILY
Qty: 4 TABLET | Refills: 0 | Status: SHIPPED | OUTPATIENT
Start: 2017-12-12 | End: 2018-04-16

## 2017-12-12 RX ADMIN — SODIUM CHLORIDE 1000 ML: 9 INJECTION, SOLUTION INTRAVENOUS at 20:15

## 2017-12-12 RX ADMIN — ACETAMINOPHEN 975 MG: 325 TABLET, FILM COATED ORAL at 19:06

## 2017-12-12 RX ADMIN — SODIUM CHLORIDE 1000 ML: 9 INJECTION, SOLUTION INTRAVENOUS at 21:06

## 2017-12-12 RX ADMIN — LEVOFLOXACIN 750 MG: 5 INJECTION, SOLUTION INTRAVENOUS at 19:55

## 2017-12-12 RX ADMIN — SODIUM CHLORIDE 1000 ML: 9 INJECTION, SOLUTION INTRAVENOUS at 19:06

## 2017-12-12 RX ADMIN — ONDANSETRON 4 MG: 2 INJECTION INTRAMUSCULAR; INTRAVENOUS at 19:06

## 2017-12-12 NOTE — ED AVS SNAPSHOT
[facility]    [address]    [place] [address]    Phone:  [phone]                                       Gino Donaldson   MRN: [id number]    Department:  [facility]   Date of Visit:  12/12/2017           After Visit Summary Signature Page     I have received my discharge instructions, and my questions have been answered. I have discussed any challenges I see with this plan with the nurse or doctor.    ..........................................................................................................................................  Patient/Patient Representative Signature      ..........................................................................................................................................  Patient Representative Print Name and Relationship to Patient    ..................................................               ................................................  Date                                            Time    ..........................................................................................................................................  Reviewed by Signature/Title    ...................................................              ..............................................  Date                                                            Time

## 2017-12-12 NOTE — ED AVS SNAPSHOT
[facility]    [address] [address]    Phone:  [phone]                                       Gino Donaldson   MRN: [id number]    Department:  [facility]   Date of Visit:  12/12/2017           Patient Information     Date Of Birth          1935        Your diagnoses for this visit were:     Community acquired pneumonia, unspecified laterality     Gross hematuria        You were seen by Selma Freeman APRN FNP.      Follow-up Information     Follow up with Juancho Patton MD In 2 days.    Specialty:  Family Practice    Why:  recheck    Contact information:    [facility]  [address] [address] [phone]          Follow up with [facility].    Specialty:  EMERGENCY MEDICINE    Why:  As needed, If symptoms worsen    Contact information:    [address] [address] [phone]    Additional information:    From [place]: Take [address]. Turn left at [address]. Turn left at the first stoplight on [address]. At the first stop sign, take a right onto [address]. Take a left into the parking lot and continue through until you reach the North enterance of the building.       From [place]: Take [address]. Take the [address] ramp towards [place]. Turn left onto [address]. Take a slight right onto [address]. Turn left at the first stoplight on [address]. At the first stop sign, take a right onto [address]. Take a left into the parking lot and continue through until you reach the North enterance of the building.       From [place]: Take [address]. Take a right at [address]. At the first stop sign, take a right onto [address]. Take a left into the parking lot and continue through until you reach the North enterance of the building.         Discharge Instructions         See attached for home  care  Rest, hydrate  Take all of antibiotics as prescribed  F/u with PCP in the next couple days for recheck  Return to ED with worsening sx.       Hematuria: Possible Causes     Many things can lead to blood in the urine (hematuria). The blood may be seen with the eye (macroscopic or gross hematuria). Or it may only be seen when the urine is looked at under a microscope (microscopic hematuria). Some of the most common causes of blood in the urine are listed below. Often, no cause for the blood can be found. This is called idiopathic hematuria.    Kidney or bladder stones are collections of crystals. They form in the urine. Stones may be found anywhere in the urinary tract. But they form most often in the kidneys or bladder. In addition to blood in the urine, they can cause severe pain.    BPH stands for benign prostatic hyperplasia. It is enlargement of the prostate gland. It happens as men age. BPH often causes problems with urination. It sometimes causes blood in the urine.    A urinary tract infection (UTI) is due to bacteria growing in the urinary tract. It can cause blood in the urine. Other symptoms include burning or pain with urination. You may need to urinate often or urgently. You may also have a fever.    Damage to the urinary tract may cause blood in the urine. This damage may be due to a blow or accident. It may also result from the use of a urinary catheter. Very hard exercise may sometimes irritate the urinary tract and cause bleeding.    Cancer may occur anywhere in the urinary tract. A tumor may sometimes cause no symptoms other than bleeding.     Other possible causes of bleeding include:    Prostatitis (infection of the prostate gland)    Taking anticoagulants    Blockage in the urinary tract    Disease or inflammation of the kidney    Cystic diseases of the kidneys    Sickle cell anemia    Vigorous exercise    Endometriosis  Date Last Reviewed: 12/1/2016 2000-2017 [employer]  [address] [place] [address]. All rights reserved. This information is not intended as a substitute for professional medical care. Always follow your healthcare professional's instructions.      Pneumonia (Adult)  Pneumonia is an infection deep within the lungs. It is in the small air sacs (alveoli). Pneumonia may be caused by a virus or bacteria. Pneumonia caused by bacteria is usually treated with an antibiotic. Severe cases may need to be treated in the hospital. Milder cases can be treated at home. Symptoms usually start to get better during the first 2 days of treatment.    Home care  Follow these guidelines when caring for yourself at home:    Rest at home for the first 2 to 3 days, or until you feel stronger. Don t let yourself get overly tired when you go back to your activities.    Stay away from cigarette smoke - yours or other people s.    You may use acetaminophen or ibuprofen to control fever or pain, unless another medicine was prescribed. If you have chronic liver or kidney disease, talk with your healthcare provider before using these medicines. Also talk with your provider if you ve had a stomach ulcer or gastrointestinal bleeding. Don t give aspirin to anyone younger than 18 years of age who is ill with a fever. It may cause severe liver damage.    Your appetite may be poor, so a light diet is fine.    Drink 6 to 8 glasses of fluids every day to make sure you are getting enough fluids. Beverages can include water, sport drinks, sodas without caffeine, juices, tea, or soup. Fluids will help loosen secretions in the lung. This will make it easier for you to cough up the phlegm (sputum). If you also have heart or kidney disease, check with your healthcare provider before you drink extra fluids.    Take antibiotic medicine prescribed until it is all gone, even if you are feeling better after a few days.  Follow-up care  Follow up with your healthcare provider in the next 2 to 3 days, or as  advised. This is to be sure the medicine is helping you get better.  If you are 65 or older, you should get a pneumococcal vaccine and a yearly flu (influenza) shot. You should also get these vaccines if you have chronic lung disease like asthma, emphysema, or COPD. Recently, a second type of pneumonia vaccine has become available for everyone over 65 years old. This is in addition to the previous vaccine. Ask your provider about this.  When to seek medical advice  Call your healthcare provider right away if any of these occur:    You don t get better within the first 48 hours of treatment    Shortness of breath gets worse    Rapid breathing (more than 25 breaths per minute)    Coughing up blood    Chest pain gets worse with breathing    Fever of 100.4 F (38 C) or higher that doesn t get better with fever medicine    Weakness, dizziness, or fainting that gets worse    Thirst or dry mouth that gets worse    Sinus pain, headache, or a stiff neck    Chest pain not caused by coughing  Date Last Reviewed: 1/1/2017 2000-2017 [employer]. [address] [place] [address]. All rights reserved. This information is not intended as a substitute for professional medical care. Always follow your healthcare professional's instructions.          Future Appointments        Provider Department Dept Phone Center    7/10/2018 8:00 AM Brandie Chappell NP [facility] [phone] Melia Stewart         Review of your medicines      START taking        Dose / Directions Last dose taken    levofloxacin 750 MG tablet   Commonly known as:  LEVAQUIN   Dose:  750 mg   Quantity:  4 tablet        Take 1 tablet (750 mg) by mouth daily   Refills:  0          Our records show that you are taking the medicines listed below. If these are incorrect, please call your family doctor or clinic.        Dose / Directions Last dose taken    albuterol 108 (90 BASE) MCG/ACT Inhaler   Commonly known as:  PROAIR HFA   Dose:   "2 puff   Quantity:  1 Inhaler        Inhale 2 puffs into the lungs every 4 hours as needed for shortness of breath / dyspnea   Refills:  0        ASPIRIN ADULT LOW STRENGTH PO   Dose:  81 mg        Take 81 mg by mouth   Refills:  0        tamsulosin 0.4 MG capsule   Commonly known as:  FLOMAX        Refills:  0                Prescriptions were sent or printed at these locations (1 Prescription)                   [facility] - [place], [place] - [address]    [address] , [place] [address]    Telephone:  [phone]   Fax:  [phone]   Hours:                  E-Prescribed (1 of 1)         levofloxacin (LEVAQUIN) 750 MG tablet                Procedures and tests performed during your visit     Procedure/Test Number of Times Performed    Blood culture 2    CBC with platelets differential 2    Comprehensive metabolic panel 1    Influenza A/B antigen 1    Lactic acid 2    UA reflex to Microscopic and Culture 1    XR Chest 2 Views 1      Orders Needing Specimen Collection     None      Pending Results     Date and Time Order Name Status Description    12/12/2017 1927 Blood culture In process     12/12/2017 1927 Blood culture In process             Pending Culture Results     Date and Time Order Name Status Description    12/12/2017 1927 Blood culture In process     12/12/2017 1927 Blood culture In process             Thank you for choosing [facility]       Thank you for choosing [facility] for your care. Our goal is always to provide you with excellent care. Hearing back from our patients is one way we can continue to improve our services. Please take a few minutes to complete the written survey that you may receive in the mail after you visit with us. Thank you!        [employer] Information     [employer] lets you send messages to your doctor, view your test results, renew your prescriptions, schedule appointments and more. To sign up, go to [email or web address] . Click on \"Log in\" on the left side of the " "screen, which will take you to the Welcome page. Then click on \"Sign up Now\" on the right side of the page.     You will be asked to enter the access code listed below, as well as some personal information. Please follow the directions to create your username and password.     Your access code is: [email or web address]  Expires: 3/12/2018  9:42 PM     Your access code will  in 90 days. If you need help or a new code, please call your [place] clinic or [phone].        Care EveryWhere ID     This is your Care EveryWhere ID. This could be used by other organizations to access your [place] medical records  [id number]        Equal Access to Services     MAYCO LOVE : Kennedy Gamboa, tonya higginbotham, pricilla aponte, margaret zamudio. So [facility] [phone].    ATENCIÓN: Si habla español, tiene a melvin disposición servicios gratuitos de asistencia lingüística. Llame al [phone].    We comply with applicable federal civil rights laws and [place] laws. We do not discriminate on the basis of race, color, national origin, age, disability, sex, sexual orientation, or gender identity.            After Visit Summary       This is your record. Keep this with you and show to your community pharmacist(s) and doctor(s) at your next visit.                  "

## 2017-12-13 NOTE — ED NOTES
Pt comes in with wife with report of fever, chills, shakiness, and being dizzy.  Pt reports started this afternoon. Pt swabbed for influenza in triage, pt has received flu swab.

## 2017-12-13 NOTE — ED PROVIDER NOTES
"  History     Chief Complaint   Patient presents with     Generalized Weakness     started this afternoon     Dizziness     started today, shaky     The history is provided by the patient. No  was used.     Gino Donaldson is a 82 year old male who lives in apartment with wife, independent of ADL's, presents to ED via private car for evaluation of nausea, cough, fever, weakness. Onset of sx 2-3 days ago, progressing and worsening today. \"I think I have pneumonia again\". Denies cp, no sob, productive cough, no abdominal pain, no n/v/d.     Problem List:    Patient Active Problem List    Diagnosis Date Noted     Benign prostatic hyperplasia, presence of lower urinary tract symptoms unspecified 07/11/2017     Priority: Medium     ACP (advance care planning) 05/02/2017     Priority: Medium     Advance Care Planning 5/2/2017: ACP Review of Chart / Resources Provided:  Reviewed chart for advance care plan.  Gino Donaldson has been provided information and resources to begin or update their advance care plan.  Added by Chema Watters             Thrombocytopenia (H) 03/20/2017     Priority: Medium     Pneumonia of both lower lobes due to infectious organism 02/06/2017     Priority: Medium     Hypercholesterolemia      Priority: Medium     Diagnosis updated by automated process. Provider to review and confirm.       Tobacco abuse, in remission      Priority: Medium     Nocturia      Priority: Medium        Past Medical History:    Past Medical History:   Diagnosis Date     Hypercholesteraemia      Nocturia      Tobacco abuse, in remission        Past Surgical History:    History reviewed. No pertinent surgical history.    Family History:    Family History   Problem Relation Age of Onset     CANCER Father      kidney     CANCER Mother      ovarian       Social History:  Marital Status:   [2]  Social History   Substance Use Topics     Smoking status: Former Smoker     Types: Cigarettes     " Smokeless tobacco: Never Used     Alcohol use Yes        Medications:      levofloxacin (LEVAQUIN) 750 MG tablet   tamsulosin (FLOMAX) 0.4 MG capsule   ASPIRIN ADULT LOW STRENGTH PO   albuterol (ALBUTEROL) 108 (90 BASE) MCG/ACT Inhaler         Review of Systems   Constitutional: Positive for activity change, appetite change, fatigue and fever.   HENT: Negative for congestion.    Eyes: Negative for redness.   Respiratory: Positive for cough. Negative for shortness of breath.    Cardiovascular: Negative for chest pain.   Gastrointestinal: Positive for nausea. Negative for abdominal pain.   Genitourinary: Negative for difficulty urinating.   Musculoskeletal: Negative for arthralgias and neck stiffness.   Skin: Negative for color change.   Neurological: Positive for weakness. Negative for headaches.   Psychiatric/Behavioral: Negative for confusion.       Physical Exam   BP: 126/55  Pulse: 98  Heart Rate: 85  Temp: 101.1  F (38.4  C)  Resp: 18  SpO2: 93 %      Physical Exam   Constitutional: He is oriented to person, place, and time. No distress.   HENT:   Head: Normocephalic and atraumatic.   Eyes: Conjunctivae are normal.   Neck: Neck supple.   Cardiovascular: Normal rate and regular rhythm.    Pulmonary/Chest: Effort normal. He has rhonchi in the right middle field. He has rales in the right lower field and the left lower field.   Abdominal: Soft. Bowel sounds are normal. There is no tenderness.   Musculoskeletal: Normal range of motion.   Neurological: He is alert and oriented to person, place, and time.   Skin: Skin is warm and dry. He is not diaphoretic.   Nursing note reviewed.      ED Course     Procedures     Labs Ordered and Resulted from Time of ED Arrival Up to the Time of Departure from the ED   CBC WITH PLATELETS DIFFERENTIAL - Abnormal; Notable for the following:        Result Value    WBC 26.0 (*)     Platelet Count 66 (*)     Absolute Neutrophil 23.7 (*)     All other components within normal limits   UA  MACROSCOPIC WITH REFLEX TO MICRO AND CULTURE - Abnormal; Notable for the following:     Ketones Urine 5 (*)     Blood Urine Moderate (*)     Protein Albumin Urine 30 (*)     RBC Urine 8 (*)     Bacteria Urine Few (*)     Mucous Urine Present (*)     Hyaline Casts 4 (*)     All other components within normal limits   COMPREHENSIVE METABOLIC PANEL - Abnormal; Notable for the following:     Glucose 165 (*)     All other components within normal limits   LACTIC ACID - Abnormal; Notable for the following:     Lactic Acid 2.4 (*)     All other components within normal limits   CBC WITH PLATELETS DIFFERENTIAL - Abnormal; Notable for the following:     WBC 24.8 (*)     RBC Count 4.38 (*)     Hematocrit 37.9 (*)     Platelet Count 64 (*)     Absolute Neutrophil 22.4 (*)     All other components within normal limits   LACTIC ACID   INFLUENZA A/B ANTIGEN     XR Chest 2 Views   Final Result   IMPRESSION: Slight interstitial prominence in the lung bases, left   greater than right. On the right this has an appearance most   consistent with fibrosis, on the left the appearance is more   suggestive of infiltrate with fibrosis. Aortic calcifications.   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      MELISSA DOWLING MD          Assessments & Plan (with Medical Decision Making)   Pt is an 82 year old male who presents with cough, fever, weakness, nausea, 2-3 days in duration.   Temp 101.1 T upon arrival. Exam as above.   IV fluids initiated, labs obtained, reveal elevated wbc, lactic acid 2.4, CXR indicates bibasilar infiltrates. Incidentally noted, hematuria.   IV levaquin given, consulted with Dr. Patton re: admission.   After 3 liters of IV fluids, lactic acid down to 1.7. Pt states he feels better and would like to go home. Advised close f/u with PCP and return with worsening sx. Pt verbalizes understanding and agrees with plan.  Epic discharge instructions given. Pt discharged in stable condition.     I have reviewed the nursing  notes.    I have reviewed the findings, diagnosis, plan and need for follow up with the patient.    Discharge Medication List as of 12/12/2017 10:02 PM      START taking these medications    Details   levofloxacin (LEVAQUIN) 750 MG tablet Take 1 tablet (750 mg) by mouth daily, Disp-4 tablet, R-0, E-Prescribe             Final diagnoses:   Community acquired pneumonia, unspecified laterality   Gross hematuria     See attached for home care  Rest, hydrate  Take all of antibiotics as prescribed  F/u with PCP in the next couple days for recheck  Return to ED with worsening sx.   12/12/2017   [facility]     Selma Freeman, HOMA FNP  12/14/17 0937

## 2017-12-13 NOTE — ED NOTES
DATE:  12/12/2017   TIME OF RECEIPT FROM LAB:  2003  LAB TEST:  Lactic acid  LAB VALUE:  2.4  RESULTS GIVEN WITH READ-BACK TO (PROVIDER):  Selma Freeman APRN FNP  TIME LAB VALUE REPORTED TO PROVIDER:   Paz

## 2017-12-13 NOTE — DISCHARGE INSTRUCTIONS
See attached for home care  Rest, hydrate  Take all of antibiotics as prescribed  F/u with PCP in the next couple days for recheck  Return to ED with worsening sx.       Hematuria: Possible Causes     Many things can lead to blood in the urine (hematuria). The blood may be seen with the eye (macroscopic or gross hematuria). Or it may only be seen when the urine is looked at under a microscope (microscopic hematuria). Some of the most common causes of blood in the urine are listed below. Often, no cause for the blood can be found. This is called idiopathic hematuria.    Kidney or bladder stones are collections of crystals. They form in the urine. Stones may be found anywhere in the urinary tract. But they form most often in the kidneys or bladder. In addition to blood in the urine, they can cause severe pain.    BPH stands for benign prostatic hyperplasia. It is enlargement of the prostate gland. It happens as men age. BPH often causes problems with urination. It sometimes causes blood in the urine.    A urinary tract infection (UTI) is due to bacteria growing in the urinary tract. It can cause blood in the urine. Other symptoms include burning or pain with urination. You may need to urinate often or urgently. You may also have a fever.    Damage to the urinary tract may cause blood in the urine. This damage may be due to a blow or accident. It may also result from the use of a urinary catheter. Very hard exercise may sometimes irritate the urinary tract and cause bleeding.    Cancer may occur anywhere in the urinary tract. A tumor may sometimes cause no symptoms other than bleeding.     Other possible causes of bleeding include:    Prostatitis (infection of the prostate gland)    Taking anticoagulants    Blockage in the urinary tract    Disease or inflammation of the kidney    Cystic diseases of the kidneys    Sickle cell anemia    Vigorous exercise    Endometriosis  Date Last Reviewed: 12/1/2016 2000-2017 The  [employer]. [address] [place] [id number]. All rights reserved. This information is not intended as a substitute for professional medical care. Always follow your healthcare professional's instructions.      Pneumonia (Adult)  Pneumonia is an infection deep within the lungs. It is in the small air sacs (alveoli). Pneumonia may be caused by a virus or bacteria. Pneumonia caused by bacteria is usually treated with an antibiotic. Severe cases may need to be treated in the hospital. Milder cases can be treated at home. Symptoms usually start to get better during the first 2 days of treatment.    Home care  Follow these guidelines when caring for yourself at home:    Rest at home for the first 2 to 3 days, or until you feel stronger. Don t let yourself get overly tired when you go back to your activities.    Stay away from cigarette smoke - yours or other people s.    You may use acetaminophen or ibuprofen to control fever or pain, unless another medicine was prescribed. If you have chronic liver or kidney disease, talk with your healthcare provider before using these medicines. Also talk with your provider if you ve had a stomach ulcer or gastrointestinal bleeding. Don t give aspirin to anyone younger than 18 years of age who is ill with a fever. It may cause severe liver damage.    Your appetite may be poor, so a light diet is fine.    Drink 6 to 8 glasses of fluids every day to make sure you are getting enough fluids. Beverages can include water, sport drinks, sodas without caffeine, juices, tea, or soup. Fluids will help loosen secretions in the lung. This will make it easier for you to cough up the phlegm (sputum). If you also have heart or kidney disease, check with your healthcare provider before you drink extra fluids.    Take antibiotic medicine prescribed until it is all gone, even if you are feeling better after a few days.  Follow-up care  Follow up with your healthcare provider in the  next 2 to 3 days, or as advised. This is to be sure the medicine is helping you get better.  If you are 65 or older, you should get a pneumococcal vaccine and a yearly flu (influenza) shot. You should also get these vaccines if you have chronic lung disease like asthma, emphysema, or COPD. Recently, a second type of pneumonia vaccine has become available for everyone over 65 years old. This is in addition to the previous vaccine. Ask your provider about this.  When to seek medical advice  Call your healthcare provider right away if any of these occur:    You don t get better within the first 48 hours of treatment    Shortness of breath gets worse    Rapid breathing (more than 25 breaths per minute)    Coughing up blood    Chest pain gets worse with breathing    Fever of 100.4 F (38 C) or higher that doesn t get better with fever medicine    Weakness, dizziness, or fainting that gets worse    Thirst or dry mouth that gets worse    Sinus pain, headache, or a stiff neck    Chest pain not caused by coughing  Date Last Reviewed: 1/1/2017 2000-2017 [employer]. [address] [place] [address]. All rights reserved. This information is not intended as a substitute for professional medical care. Always follow your healthcare professional's instructions.

## 2017-12-13 NOTE — ED NOTES
DATE:  12/12/2017   TIME OF RECEIPT FROM LAB:  2000  LAB TEST:  Potassium  LAB VALUE:  6.2  RESULTS GIVEN WITH READ-BACK TO (PROVIDER):  Selma Freeman APRN FNP  TIME LAB VALUE REPORTED TO PROVIDER:   2000

## 2017-12-13 NOTE — ED NOTES
"Pt states he has had stuffy nose, cough, \"very hoarse\" with clear sputum production. and feeling tired, denies body aches, stated some nausea. Wife reports pt had fever up to 101 \"with the chills and shakes\". Pt states \"this is what I felt like last time I had pneumonia\". FAST neuro negative.  "

## 2017-12-14 ASSESSMENT — ENCOUNTER SYMPTOMS
NECK STIFFNESS: 0
COUGH: 1
WEAKNESS: 1
FEVER: 1
NAUSEA: 1
ARTHRALGIAS: 0
HEADACHES: 0
ACTIVITY CHANGE: 1
COLOR CHANGE: 0
DIFFICULTY URINATING: 0
SHORTNESS OF BREATH: 0
EYE REDNESS: 0
FATIGUE: 1
CONFUSION: 0
ABDOMINAL PAIN: 0
APPETITE CHANGE: 1

## 2017-12-18 LAB
BACTERIA SPEC CULT: NORMAL
BACTERIA SPEC CULT: NORMAL
Lab: NORMAL
Lab: NORMAL
SPECIMEN SOURCE: NORMAL
SPECIMEN SOURCE: NORMAL

## 2018-01-04 NOTE — PROGRESS NOTES
Patient Information     Patient Name MRGino Hoffman [id number] Male 1935      Progress Notes by Lawrence Rojas MD at 2017  9:00 AM     Author:  Lawrence Rojas MD Service:  (none) Author Type:  Physician     Filed:  2017 11:35 AM Encounter Date:  2017 Status:  Signed     :  Lawrence Rojas MD (Physician)            I was asked to see this patient by Dr Sun ([facility]) and provide my opinion about the following:  Urinary retention    Type of Visit  Consult    Chief Complaint  Urinary retention    HPI  Mr. Donaldson is a 81 y.o. male who presents with urinary retention.  He presented to the ED with symptoms of pelvic pain and inability to void and a catheter was placed.  600mL drained from the catheter.  UA was negative for UTI.  Catheter was placed 4 day(s) ago.  He has not had catheters placed in the past.  He has not previously had prostate procedure/surgery.    He is currently on the following medications for urinary complaints: Flomax x 4 days.    Recurrent bladder infections  No  Incontinence    No  Constipation    No      Past Medical History  He  has a past medical history of Benign nodular prostatic hyperplasia (2017); Hypercholesterolemia; Nocturia; Tobacco abuse, in remission; and Urinary retention (2017).    Past Surgical History  None    Medications  He has a current medication list which includes the following prescription(s): albuterol hfa, aspirin, tamsulosin, and tamsulosin.    Allergies  No Known Allergies    Social History  He  reports that he has quit smoking. His smoking use included Cigarettes. He has never used smokeless tobacco. He reports that he drinks alcohol. He reports that he does not use illicit drugs.  No drug abuse.    Family History  No family history of urologic cancers    Review of Systems  I personally reviewed the ROS with the patient.    Nursing Notes:   Nery Beckham  2017  9:30 AM  Signed  Patient presents to the  clinic for urinary retention.   Nery ECKERT ROGELIO 4/24/2017    Review of Systems:    Weight loss:    No     Recent fever/chills:  No   Night sweats:   No  Current skin rash:  No   Recent hair loss:  No  Heat intolerance:  No   Cold intolerance:  No  Chest pain:   No   Palpitations:   No  Shortness of breath:  No   Wheezing:   No  Constipation:    No   Diarrhea:   No   Nausea:   No   Vomiting:   No   Kidney/side pain:  No   Back pain:   No  Frequent headaches:  No   Dizziness:     No  Leg swelling:   No   Calf pain:    No    Parents, brothers or sisters with history of kidney cancer?   No  Parents, brothers or sisters with history of bladder cancer: No    Nery ECKERT ROGELIO 4/24/2017            Nery Beckham  4/24/2017 10:16 AM  Signed  Void Trial  Verbal order read back by Lawrence Rojas MD to perform void trial and post void residual bladder scan.  Patient's bladder was filled under gravity with 150mL of sterile saline.  Patient voided 100mL.  Post-void residual was measured by ultrasonic bladder scanner: 23 mL    Nery TOMEKA ROGELIO 4/24/2017      Physical Exam  Vitals:     04/24/17 0909   BP: 110/70   Pulse: 72   Temp: 98.1  F (36.7  C)   TempSrc: Temporal   Weight: 91.8 kg (202 lb 6.4 oz)     Constitutional: No acute distress.  Alert and cooperative   Head: NCAT  Eyes: Conjunctivae normal  Cardiovascular: Regular rate.  Pulmonary/Chest: Respirations are even and non-labored bilaterally, no audible wheezing  Abdominal: Soft. No distension, tenderness, masses or guarding.   Neurological: A + O x 3.  Cranial Nerves II-XII grossly intact.  Extremities: SUSANA x 4, Warm. No clubbing.  No cyanosis.    Skin: Pink, warm and dry.  No visible rashes noted.  Psychiatric:  Normal mood and affect  Back:  No left CVA tenderness.  No right CVA tenderness.  Genitourinary:  Catheter in place draining clear urine    Labs  PSA 4.2 2 years ago (verbal report)    Assessment  Mr. Donaldson is a 81 y.o. male who presents with urinary retention requiring  catheter placement.  Passed void trial today.    Plan  Continue Flomax 0.4mg daily  Follow up in 1 week with Ms Song, PVR at that visit

## 2018-01-04 NOTE — NURSING NOTE
Patient Information     Patient Name MRN Gino Camacho [id number] Male 1935      Nursing Note by Nery Beckham at 2017  9:00 AM     Author:  Nery Beckham Service:  (none) Author Type:  (none)     Filed:  2017  9:30 AM Encounter Date:  2017 Status:  Signed     :  Nery Beckham            Patient presents to the clinic for urinary retention.   Nery ECKERT CMA 2017    Review of Systems:    Weight loss:    No     Recent fever/chills:  No   Night sweats:   No  Current skin rash:  No   Recent hair loss:  No  Heat intolerance:  No   Cold intolerance:  No  Chest pain:   No   Palpitations:   No  Shortness of breath:  No   Wheezing:   No  Constipation:    No   Diarrhea:   No   Nausea:   No   Vomiting:   No   Kidney/side pain:  No   Back pain:   No  Frequent headaches:  No   Dizziness:     No  Leg swelling:   No   Calf pain:    No    Parents, brothers or sisters with history of kidney cancer?   No  Parents, brothers or sisters with history of bladder cancer: No    Nery ECKERT CMA 2017

## 2018-01-04 NOTE — NURSING NOTE
Patient Information     Patient Name MRN Sex Gino Davila [id number] Male 1935      Nursing Note by Nery Beckham at 2017  9:00 AM     Author:  Nery Beckham Service:  (none) Author Type:  (none)     Filed:  2017 10:16 AM Encounter Date:  2017 Status:  Signed     :  Nery Beckham            Void Trial  Verbal order read back by Lawrence Rojas MD to perform void trial and post void residual bladder scan.  Patient's bladder was filled under gravity with 150mL of sterile saline.  Patient voided 100mL.  Post-void residual was measured by ultrasonic bladder scanner: 23 mL    Nery ECKERT CMA 2017

## 2018-01-27 VITALS
BODY MASS INDEX: 27.45 KG/M2 | WEIGHT: 202.4 LBS | HEART RATE: 72 BPM | SYSTOLIC BLOOD PRESSURE: 110 MMHG | TEMPERATURE: 98.1 F | DIASTOLIC BLOOD PRESSURE: 70 MMHG

## 2018-03-06 ENCOUNTER — DOCUMENTATION ONLY (OUTPATIENT)
Dept: FAMILY MEDICINE | Facility: OTHER | Age: 83
End: 2018-03-06

## 2018-03-06 RX ORDER — ALBUTEROL SULFATE 90 UG/1
2 AEROSOL, METERED RESPIRATORY (INHALATION) EVERY 4 HOURS PRN
COMMUNITY
Start: 2017-04-20 | End: 2018-04-16

## 2018-04-16 ENCOUNTER — HOSPITAL ENCOUNTER (EMERGENCY)
Facility: HOSPITAL | Age: 83
Discharge: HOME | End: 2018-04-16
Attending: EMERGENCY MEDICINE | Admitting: EMERGENCY MEDICINE
Payer: MEDICARE

## 2018-04-16 VITALS
SYSTOLIC BLOOD PRESSURE: 164 MMHG | RESPIRATION RATE: 12 BRPM | OXYGEN SATURATION: 98 % | BODY MASS INDEX: 28 KG/M2 | TEMPERATURE: 96 F | DIASTOLIC BLOOD PRESSURE: 75 MMHG | WEIGHT: 200 LBS | HEIGHT: 71 IN | HEART RATE: 58 BPM

## 2018-04-16 DIAGNOSIS — R33.9 URINE RETENTION: Primary | ICD-10-CM

## 2018-04-16 PROCEDURE — 99283 EMERGENCY DEPT VISIT LOW MDM: CPT

## 2018-04-16 PROCEDURE — 99283 EMERGENCY DEPT VISIT LOW MDM: CPT | Mod: Z6 | Performed by: EMERGENCY MEDICINE

## 2018-04-16 NOTE — ED PROVIDER NOTES
History     Chief Complaint   Patient presents with     Urinary Retention     Patient states he is unable to pass any urine. C/O mild bladder pressure     HPI  Gino Donaldson is a 82 year old male who presented to the emergency department with acute urine retention for the last 5 hours.  Patient is having low abdominal pressure because he has been able to pee.  He had a similar episode last year and is currently on Flomax.  He has been diagnosed with benign prostatic hyperplasia in the past.  Is also currently being followed up by urologist.  He denies any pain with urination, fever, nausea, vomiting or flank pain.    Problem List:    Patient Active Problem List    Diagnosis Date Noted     Benign prostatic hyperplasia, presence of lower urinary tract symptoms unspecified 07/11/2017     Priority: Medium     ACP (advance care planning) 05/02/2017     Priority: Medium     Advance Care Planning 5/2/2017: ACP Review of Chart / Resources Provided:  Reviewed chart for advance care plan.  Gino Donaldson has been provided information and resources to begin or update their advance care plan.  Added by Chema Watters             Thrombocytopenia (H) 03/20/2017     Priority: Medium     Pneumonia of both lower lobes due to infectious organism 02/06/2017     Priority: Medium     Hypercholesterolemia      Priority: Medium     Diagnosis updated by automated process. Provider to review and confirm.       Tobacco abuse, in remission      Priority: Medium     Nocturia      Priority: Medium        Past Medical History:    Past Medical History:   Diagnosis Date     Hypercholesteraemia      Nocturia      Tobacco abuse, in remission        Past Surgical History:    No past surgical history on file.    Family History:    Family History   Problem Relation Age of Onset     CANCER Father      kidney     CANCER Mother      ovarian       Social History:  Marital Status:   [2]  Social History   Substance Use Topics      "Smoking status: Former Smoker     Types: Cigarettes     Smokeless tobacco: Never Used     Alcohol use Yes        Medications:      tamsulosin (FLOMAX) 0.4 MG capsule   ASPIRIN ADULT LOW STRENGTH PO         Review of Systems   All other systems reviewed and are negative.      Physical Exam   BP: 164/75  Pulse: 58  Temp: 96  F (35.6  C)  Resp: 12  Height: 180.3 cm (5' 11\")  Weight: 90.7 kg (200 lb)  SpO2: 98 %      Physical Exam   Constitutional: He is oriented to person, place, and time. He appears well-developed and well-nourished. He appears distressed.   Cardiovascular: Normal rate, regular rhythm and normal heart sounds.    Pulmonary/Chest: Effort normal and breath sounds normal. No respiratory distress.   Abdominal: Soft. Bowel sounds are normal. He exhibits no distension. There is no tenderness.   Neurological: He is alert and oriented to person, place, and time.   Skin: He is not diaphoretic.   Nursing note and vitals reviewed.      ED Course   Straight urethral catheter    ED Course     Procedures       No results found for this or any previous visit (from the past 24 hour(s)).    Medications - No data to display    Assessments & Plan (with Medical Decision Making)   Acute urine retention: Straight catheterization was done with good relief.  Patient would actually like to try continuing Flomax and if he gets another urine retention day he will come back for an indwelling urinary catheter.  He will follow-up with his urologist at scheduled appointment.  Subsequently discharged home.    I have reviewed the nursing notes.    I have reviewed the findings, diagnosis, plan and need for follow up with the patient.    Discharge Medication List as of 4/16/2018  8:14 AM          Final diagnoses:   Urine retention       4/16/2018   [facility]     Ken Montilla MD  04/17/18 2011    "

## 2018-04-16 NOTE — ED AVS SNAPSHOT
[facility]    [address]    [place] [address]    Phone:  [phone]                                       Gino Donaldson   MRN: [id number]    Department:  [facility]   Date of Visit:  4/16/2018           After Visit Summary Signature Page     I have received my discharge instructions, and my questions have been answered. I have discussed any challenges I see with this plan with the nurse or doctor.    ..........................................................................................................................................  Patient/Patient Representative Signature      ..........................................................................................................................................  Patient Representative Print Name and Relationship to Patient    ..................................................               ................................................  Date                                            Time    ..........................................................................................................................................  Reviewed by Signature/Title    ...................................................              ..............................................  Date                                                            Time

## 2018-04-16 NOTE — DISCHARGE INSTRUCTIONS
Urinary Retention (Male)  Urinary retention is the medical term for difficulty or inability to pass urine, even though your bladder is full.  Causes  The most common cause of urinary retention in men is the bladder outlet being blocked. This can be due to an enlarged prostate gland or a bladder infection. Certain medicines can also cause this problem. This condition is more likely to occur as men get older.    This condition is treated by insertion of a catheter into the bladder to drain the urine. This provides immediate relief. The catheter may need to remain in place for a few days to prevent a recurrence. The catheter has a balloon on the tip which was inflated after insertion. This prevents the catheter from falling out.  Symptoms  Common symptoms of urinary retention include:    Pain (not experienced by everyone)    Frequent urination    Feeling that the bladder is still full after urinating    Incontinence (not being able to control the release of urine)    Swollen abdomen  Treatment  This condition is treated by inserting a tube (catheter) into the bladder to drain the urine. This provides immediate relief. The catheter may need to stay in place for a few days. The catheter has a balloon on the tip, which is inflated after insertion. This prevents the catheter from falling out.  Home care    If you were given antibiotics, take them until they are used up, or your healthcare provider tells you to stop. It is important to finish the antibiotics even though you feel better. This is to make sure your infection has cleared.    If a catheter was left in place, it is important to keep bacteria from getting into the collection bag. Do not disconnect the catheter from the collection bag.    Use a leg band to secure the drainage tube, so it does not pull on the catheter. Drain the collection bag when it becomes full using the drain spout at the bottom of the bag.    Do not pull on or try to remove your catheter.  This will injure your urethra. The catheter must be removed by a healthcare provider.  Follow-up care  Follow up with your healthcare provider, or as advised.  If a catheter was left in place, it can usually be removed within 3 to 7 days. Some conditions require the catheter to stay in longer. Your healthcare provider will tell you when to return to have the catheter removed.  When to seek medical advice  Call your healthcare provider right away if any of these occur:    Fever of 100.4 F (38 C) or higher, or as directed by your healthcare provider    Bladder or lower-abdominal pain or fullness    Abdominal swelling, nausea, vomiting, or back pain    Blood or urine leakage around the catheter    Bloody urine coming from the catheter (if a new symptom)    Weakness, dizziness, or fainting    Confusion or change in usual level of alertness    If a catheter was left in place, return if:    Catheter falls out    Catheter stops draining for 6 hours  Date Last Reviewed: 7/26/2015 2000-2017 [employer]. [address] [place] [address]. All rights reserved. This information is not intended as a substitute for professional medical care. Always follow your healthcare professional's instructions.

## 2018-04-16 NOTE — ED NOTES
"Pt presents to the ED alone with c/o \"can't pee for 4 hours\".  States has had a catheter in the past for several days when unable to void.  Takes flomax at HS.  States he sees Josesito Song at the clinic and has an appt in July.  Assessment is complete.  Call light is given.   "

## 2018-04-16 NOTE — ED NOTES
Pt given discharge papers at his time and verbalizes understanding of d/c dx and return to the ED or UC for indwelling catheter per Dr Montilla if unable to void.  Denies any further pain.  Discharged ambulatory to the ED doors.

## 2018-04-16 NOTE — ED AVS SNAPSHOT
[facility]    [address] [address]    Phone:  [phone]                                       Gino Donaldson   MRN: [id number]    Department:  [facility]   Date of Visit:  4/16/2018           Patient Information     Date Of Birth          1935        Your diagnoses for this visit were:     Urine retention        You were seen by Ken Montilla MD.      Follow-up Information     Follow up with Juancho Patton MD.    Specialty:  Family Practice    Why:  If symptoms worsen, As needed    Contact information:    [facility]  [address] [address]  [phone]          Discharge Instructions         Urinary Retention (Male)  Urinary retention is the medical term for difficulty or inability to pass urine, even though your bladder is full.  Causes  The most common cause of urinary retention in men is the bladder outlet being blocked. This can be due to an enlarged prostate gland or a bladder infection. Certain medicines can also cause this problem. This condition is more likely to occur as men get older.    This condition is treated by insertion of a catheter into the bladder to drain the urine. This provides immediate relief. The catheter may need to remain in place for a few days to prevent a recurrence. The catheter has a balloon on the tip which was inflated after insertion. This prevents the catheter from falling out.  Symptoms  Common symptoms of urinary retention include:    Pain (not experienced by everyone)    Frequent urination    Feeling that the bladder is still full after urinating    Incontinence (not being able to control the release of urine)    Swollen abdomen  Treatment  This condition is treated by inserting a tube (catheter) into the bladder to drain the urine. This provides immediate relief. The catheter may need to stay in place for a few days. The catheter has a balloon on the tip, which is inflated after  insertion. This prevents the catheter from falling out.  Home care    If you were given antibiotics, take them until they are used up, or your healthcare provider tells you to stop. It is important to finish the antibiotics even though you feel better. This is to make sure your infection has cleared.    If a catheter was left in place, it is important to keep bacteria from getting into the collection bag. Do not disconnect the catheter from the collection bag.    Use a leg band to secure the drainage tube, so it does not pull on the catheter. Drain the collection bag when it becomes full using the drain spout at the bottom of the bag.    Do not pull on or try to remove your catheter. This will injure your urethra. The catheter must be removed by a healthcare provider.  Follow-up care  Follow up with your healthcare provider, or as advised.  If a catheter was left in place, it can usually be removed within 3 to 7 days. Some conditions require the catheter to stay in longer. Your healthcare provider will tell you when to return to have the catheter removed.  When to seek medical advice  Call your healthcare provider right away if any of these occur:    Fever of 100.4 F (38 C) or higher, or as directed by your healthcare provider    Bladder or lower-abdominal pain or fullness    Abdominal swelling, nausea, vomiting, or back pain    Blood or urine leakage around the catheter    Bloody urine coming from the catheter (if a new symptom)    Weakness, dizziness, or fainting    Confusion or change in usual level of alertness    If a catheter was left in place, return if:    Catheter falls out    Catheter stops draining for 6 hours  Date Last Reviewed: 7/26/2015 2000-2017 [employer]. [address] [place] [address], [place] [address]. All rights reserved. This information is not intended as a substitute for professional medical care. Always follow your healthcare professional's instructions.          Your next 10  "appointments already scheduled     Jul 10, 2018  8:00 AM CDT   (Arrive by 7:45 AM)   Return Visit with Brandie Song NP   [facility] [place] ([facility] [place] )    [address]  [place] [address]   [phone]                 Review of your medicines      Our records show that you are taking the medicines listed below. If these are incorrect, please call your family doctor or clinic.        Dose / Directions Last dose taken    ASPIRIN ADULT LOW STRENGTH PO   Dose:  81 mg        Take 81 mg by mouth   Refills:  0        tamsulosin 0.4 MG capsule   Commonly known as:  FLOMAX        Refills:  0                Orders Needing Specimen Collection     None      Pending Results     No orders found from 2018 to 2018.            Pending Culture Results     No orders found from 2018 to 2018.            Thank you for choosing [facility]       Thank you for choosing [facility] for your care. Our goal is always to provide you with excellent care. Hearing back from our patients is one way we can continue to improve our services. Please take a few minutes to complete the written survey that you may receive in the mail after you visit with us. Thank you!        [employer] Information     [employer] lets you send messages to your doctor, view your test results, renew your prescriptions, schedule appointments and more. To sign up, go to [email or web address] . Click on \"Log in\" on the left side of the screen, which will take you to the Welcome page. Then click on \"Sign up Now\" on the right side of the page.     You will be asked to enter the access code listed below, as well as some personal information. Please follow the directions to create your username and password.     Your access code is: [email or web address]  Expires: 7/15/2018  8:14 AM     Your access code will  in 90 days. If you need help or a new code, please call your [facility] clinic or [phone].        Care EveryWhere ID     This " is your Care EveryWhere ID. This could be used by other organizations to access your [facility] medical records  [id number]        Equal Access to Services     MAYCO LOVE : Hadii aracelis Gamboa, tonya higginbotham, pricilla aponte, margaret zamudio. So [facility] [phone].    ATENCIÓN: Si habla español, tiene a melvin disposición servicios gratuitos de asistencia lingüística. Llame al [phone].    We comply with applicable federal civil rights laws and [place] laws. We do not discriminate on the basis of race, color, national origin, age, disability, sex, sexual orientation, or gender identity.            After Visit Summary       This is your record. Keep this with you and show to your community pharmacist(s) and doctor(s) at your next visit.

## 2019-10-25 NOTE — TELEPHONE ENCOUNTER
10:00 AM    Reason for Call: Phone Call    Description:  Chris would like a copy of his AVS from his last visit. Please call Chris.    Was an appointment offered for this call   No    Preferred method for responding to this message: [phone]    If we cannot reach you directly, may we leave a detailed response at the number you provided?  Yes    Shannan Langston       Axis degrees  45 R Axis degrees  26 T Axis 

## 2021-02-10 ENCOUNTER — IMMUNIZATION (OUTPATIENT)
Dept: FAMILY MEDICINE | Facility: OTHER | Age: 86
End: 2021-02-10
Attending: FAMILY MEDICINE
Payer: MEDICARE

## 2021-02-10 PROCEDURE — 91300 PR COVID VAC PFIZER DIL RECON 30 MCG/0.3 ML IM: CPT

## 2021-02-27 ENCOUNTER — HEALTH MAINTENANCE LETTER (OUTPATIENT)
Age: 86
End: 2021-02-27

## 2021-03-03 ENCOUNTER — IMMUNIZATION (OUTPATIENT)
Dept: FAMILY MEDICINE | Facility: OTHER | Age: 86
End: 2021-03-03
Attending: FAMILY MEDICINE
Payer: MEDICARE

## 2021-03-03 PROCEDURE — 91300 PR COVID VAC PFIZER DIL RECON 30 MCG/0.3 ML IM: CPT | Performed by: COUNSELOR

## 2021-10-02 ENCOUNTER — HEALTH MAINTENANCE LETTER (OUTPATIENT)
Age: 86
End: 2021-10-02

## 2021-12-20 ENCOUNTER — HOSPITAL ENCOUNTER (EMERGENCY)
Facility: HOSPITAL | Age: 86
Discharge: HOME | End: 2021-12-20
Admitting: NURSE PRACTITIONER
Payer: MEDICARE

## 2021-12-20 ENCOUNTER — APPOINTMENT (OUTPATIENT)
Dept: GENERAL RADIOLOGY | Facility: HOSPITAL | Age: 86
End: 2021-12-20
Attending: NURSE PRACTITIONER
Payer: MEDICARE

## 2021-12-20 VITALS
SYSTOLIC BLOOD PRESSURE: 190 MMHG | RESPIRATION RATE: 21 BRPM | DIASTOLIC BLOOD PRESSURE: 106 MMHG | HEART RATE: 68 BPM | TEMPERATURE: 97.6 F | OXYGEN SATURATION: 98 %

## 2021-12-20 DIAGNOSIS — M25.551 CHRONIC PAIN OF BOTH HIPS: ICD-10-CM

## 2021-12-20 DIAGNOSIS — M25.552 CHRONIC PAIN OF BOTH HIPS: ICD-10-CM

## 2021-12-20 DIAGNOSIS — M25.552 HIP PAIN, LEFT: ICD-10-CM

## 2021-12-20 DIAGNOSIS — G89.29 CHRONIC PAIN OF BOTH HIPS: ICD-10-CM

## 2021-12-20 DIAGNOSIS — R29.898 WEAKNESS OF BOTH LOWER EXTREMITIES: Primary | ICD-10-CM

## 2021-12-20 LAB
ALBUMIN UR-MCNC: NEGATIVE MG/DL
ANION GAP SERPL CALCULATED.3IONS-SCNC: 4 MMOL/L (ref 3–14)
APPEARANCE UR: CLEAR
BASOPHILS # BLD AUTO: 0.1 10E3/UL (ref 0–0.2)
BASOPHILS NFR BLD AUTO: 1 %
BILIRUB UR QL STRIP: NEGATIVE
BUN SERPL-MCNC: 16 MG/DL (ref 7–30)
CALCIUM SERPL-MCNC: 8.7 MG/DL (ref 8.5–10.1)
CHLORIDE BLD-SCNC: 108 MMOL/L (ref 94–109)
CO2 SERPL-SCNC: 27 MMOL/L (ref 20–32)
COLOR UR AUTO: ABNORMAL
CREAT SERPL-MCNC: 0.99 MG/DL (ref 0.66–1.25)
EOSINOPHIL # BLD AUTO: 0.1 10E3/UL (ref 0–0.7)
EOSINOPHIL NFR BLD AUTO: 1 %
ERYTHROCYTE [DISTWIDTH] IN BLOOD BY AUTOMATED COUNT: 13.6 % (ref 10–15)
GFR SERPL CREATININE-BSD FRML MDRD: 70 ML/MIN/1.73M2
GLUCOSE BLD-MCNC: 92 MG/DL (ref 70–99)
GLUCOSE UR STRIP-MCNC: NEGATIVE MG/DL
HCT VFR BLD AUTO: 42.4 % (ref 40–53)
HGB BLD-MCNC: 14.3 G/DL (ref 13.3–17.7)
HGB UR QL STRIP: ABNORMAL
HYALINE CASTS: 5 /LPF
IMM GRANULOCYTES # BLD: 0 10E3/UL
IMM GRANULOCYTES NFR BLD: 0 %
KETONES UR STRIP-MCNC: 10 MG/DL
LEUKOCYTE ESTERASE UR QL STRIP: NEGATIVE
LYMPHOCYTES # BLD AUTO: 1.7 10E3/UL (ref 0.8–5.3)
LYMPHOCYTES NFR BLD AUTO: 21 %
MCH RBC QN AUTO: 30.9 PG (ref 26.5–33)
MCHC RBC AUTO-ENTMCNC: 33.7 G/DL (ref 31.5–36.5)
MCV RBC AUTO: 92 FL (ref 78–100)
MONOCYTES # BLD AUTO: 0.6 10E3/UL (ref 0–1.3)
MONOCYTES NFR BLD AUTO: 8 %
MUCOUS THREADS #/AREA URNS LPF: PRESENT /LPF
NEUTROPHILS # BLD AUTO: 5.5 10E3/UL (ref 1.6–8.3)
NEUTROPHILS NFR BLD AUTO: 69 %
NITRATE UR QL: NEGATIVE
NRBC # BLD AUTO: 0 10E3/UL
NRBC BLD AUTO-RTO: 0 /100
NT-PROBNP SERPL-MCNC: 188 PG/ML (ref 0–1800)
PH UR STRIP: 6.5 [PH] (ref 4.7–8)
PLATELET # BLD AUTO: 117 10E3/UL (ref 150–450)
POTASSIUM BLD-SCNC: 3.9 MMOL/L (ref 3.4–5.3)
RBC # BLD AUTO: 4.63 10E6/UL (ref 4.4–5.9)
RBC URINE: 3 /HPF
SODIUM SERPL-SCNC: 139 MMOL/L (ref 133–144)
SP GR UR STRIP: 1.02 (ref 1–1.03)
SQUAMOUS EPITHELIAL: 0 /HPF
UROBILINOGEN UR STRIP-MCNC: NORMAL MG/DL
WBC # BLD AUTO: 7.9 10E3/UL (ref 4–11)
WBC URINE: 1 /HPF

## 2021-12-20 PROCEDURE — 36415 COLL VENOUS BLD VENIPUNCTURE: CPT | Performed by: NURSE PRACTITIONER

## 2021-12-20 PROCEDURE — 85025 COMPLETE CBC W/AUTO DIFF WBC: CPT | Performed by: NURSE PRACTITIONER

## 2021-12-20 PROCEDURE — 99284 EMERGENCY DEPT VISIT MOD MDM: CPT | Performed by: NURSE PRACTITIONER

## 2021-12-20 PROCEDURE — 80048 BASIC METABOLIC PNL TOTAL CA: CPT | Performed by: NURSE PRACTITIONER

## 2021-12-20 PROCEDURE — 73502 X-RAY EXAM HIP UNI 2-3 VIEWS: CPT

## 2021-12-20 PROCEDURE — 81001 URINALYSIS AUTO W/SCOPE: CPT | Performed by: NURSE PRACTITIONER

## 2021-12-20 PROCEDURE — 99284 EMERGENCY DEPT VISIT MOD MDM: CPT

## 2021-12-20 PROCEDURE — 250N000013 HC RX MED GY IP 250 OP 250 PS 637: Performed by: NURSE PRACTITIONER

## 2021-12-20 PROCEDURE — 83880 ASSAY OF NATRIURETIC PEPTIDE: CPT | Mod: GZ | Performed by: NURSE PRACTITIONER

## 2021-12-20 RX ORDER — ACETAMINOPHEN 325 MG/1
650 TABLET ORAL ONCE
Status: COMPLETED | OUTPATIENT
Start: 2021-12-20 | End: 2021-12-20

## 2021-12-20 RX ADMIN — ACETAMINOPHEN 650 MG: 325 TABLET, FILM COATED ORAL at 21:26

## 2021-12-20 ASSESSMENT — ENCOUNTER SYMPTOMS
GASTROINTESTINAL NEGATIVE: 1
CARDIOVASCULAR NEGATIVE: 1
RESPIRATORY NEGATIVE: 1
EYES NEGATIVE: 1
CONSTITUTIONAL NEGATIVE: 1
ENDOCRINE NEGATIVE: 1
PSYCHIATRIC NEGATIVE: 1
HEMATOLOGIC/LYMPHATIC NEGATIVE: 1
NEUROLOGICAL NEGATIVE: 1
ALLERGIC/IMMUNOLOGIC NEGATIVE: 1

## 2021-12-21 ENCOUNTER — TELEPHONE (OUTPATIENT)
Dept: EMERGENCY MEDICINE | Facility: HOSPITAL | Age: 86
End: 2021-12-21
Payer: COMMERCIAL

## 2021-12-21 NOTE — ED TRIAGE NOTES
Pt reports bilateral lower extremity weakness and has had multiple falls over the last couple days. Pt lives alone at home. Pt reports the weakness is worse on the left with hip pain on that side. Pt denies hitting his head when he has fallen. Pt denies any blood thinners. Pt alert and oriented x3.

## 2021-12-21 NOTE — ED NOTES
Wheeled to ED room 5 via [facility] after 2 falls at home. Pt denies hitting his head and does not take blood thinners. 3+ pitting edema bilaterally to LE.

## 2021-12-21 NOTE — DISCHARGE INSTRUCTIONS
Please continue with Tylenol or Ibuprofen for pain. Make sure you have food in stomach when taking Ibuprofen. Follow up with your primary care provider this week for evaluation of BP and your chronic pain. Physical therapy will contact you to set up a visit to have you strengthen with home physical therapy.     Thank you

## 2021-12-21 NOTE — ED NOTES
Emergency Department Attending Supervision Note    I have personally seen and examined the patient.  Case reviewed and discussed with Michelle CAMARA.  I have reviewed and agree with the PMH, FH, SOC, ROS.  Please see today's note by HOA.  HOA care under my supervision.    Key Exam:  Able to lift b/l LE from supine position, pitting edema b/l LE, ttp to L hip    Assessment:  Generalized weakness    Key Medical Decision Making/Plan:  Generalized weakness to LE causing frequent falls, metabolic workup, image L hip, if workup neg, discharge home w/ pt referral      Bennie Lawton MD on 12/20/2021 at 7:47 PM         Bennie Lawton MD  12/20/21 1949

## 2021-12-21 NOTE — ED PROVIDER NOTES
History     Chief Complaint   Patient presents with     Fall     The history is provided by the patient and the EMS personnel.     Gino Donaldson is a 86 year old male who with a history of BPH, hypercholesterolemia, tobacco abuse, nocturia, pneumonia, thrombocytopenia, presents with increased lower extremity weakness and left hip that gives out. Yesterday he fell once during the day and twice today. He denies hitting his head or having any new pain. He has chronic bilateral hip pain worse on left. Currently while laying in bed his pain is a 1/10, when standing his pain is 7/10.     Allergies:  No Known Allergies    Problem List:    Patient Active Problem List    Diagnosis Date Noted     Benign prostatic hyperplasia, presence of lower urinary tract symptoms unspecified 07/11/2017     Priority: Medium     ACP (advance care planning) 05/02/2017     Priority: Medium     Advance Care Planning 5/2/2017: ACP Review of Chart / Resources Provided:  Reviewed chart for advance care plan.  Gino Donaldson has been provided information and resources to begin or update their advance care plan.  Added by Chema Watters             Thrombocytopenia (H) 03/20/2017     Priority: Medium     Pneumonia of both lower lobes due to infectious organism 02/06/2017     Priority: Medium     Hypercholesterolemia      Priority: Medium     Diagnosis updated by automated process. Provider to review and confirm.       Tobacco abuse, in remission      Priority: Medium     Nocturia      Priority: Medium        Past Medical History:    Past Medical History:   Diagnosis Date     Hypercholesteraemia      Nocturia      Tobacco abuse, in remission        Past Surgical History:    History reviewed. No pertinent surgical history.    Family History:    Family History   Problem Relation Age of Onset     Cancer Father         kidney     Cancer Mother         ovarian       Social History:  Marital Status:   [2]  Social History     Tobacco  Use     Smoking status: Former Smoker     Types: Cigarettes     Smokeless tobacco: Never Used   Substance Use Topics     Alcohol use: Yes     Drug use: No        Medications:    FINASTERIDE PO  tamsulosin (FLOMAX) 0.4 MG capsule  ASPIRIN ADULT LOW STRENGTH PO          Review of Systems   Constitutional: Negative.    HENT: Negative.    Eyes: Negative.    Respiratory: Negative.    Cardiovascular: Negative.    Gastrointestinal: Negative.    Endocrine: Negative.    Genitourinary: Negative.    Musculoskeletal:        Bilateral hip pain that is chronic   Skin: Negative.    Allergic/Immunologic: Negative.    Neurological: Negative.    Hematological: Negative.    Psychiatric/Behavioral: Negative.        Physical Exam   BP: 169/96  Pulse: 81  Temp: 97.6  F (36.4  C)  Resp: 18  SpO2: 97 %      Physical Exam  Vitals and nursing note reviewed.   Constitutional:       Appearance: Normal appearance. He is normal weight.   HENT:      Head: Normocephalic and atraumatic.      Nose: Nose normal.      Mouth/Throat:      Mouth: Mucous membranes are moist.      Pharynx: Oropharynx is clear.   Eyes:      Extraocular Movements: Extraocular movements intact.      Conjunctiva/sclera: Conjunctivae normal.      Pupils: Pupils are equal, round, and reactive to light.   Cardiovascular:      Rate and Rhythm: Normal rate and regular rhythm.      Pulses: Normal pulses.      Heart sounds: Normal heart sounds.   Pulmonary:      Effort: Pulmonary effort is normal.      Breath sounds: Normal breath sounds.   Abdominal:      General: Abdomen is flat. Bowel sounds are normal.      Palpations: Abdomen is soft.   Musculoskeletal:         General: Tenderness present. Normal range of motion.      Cervical back: Normal range of motion.        Legs:       Comments: Area of increased pain with ambulation.   Skin:     General: Skin is warm and dry.   Neurological:      General: No focal deficit present.      Mental Status: He is alert and oriented to person,  place, and time.   Psychiatric:         Mood and Affect: Mood normal.         Behavior: Behavior normal.         Thought Content: Thought content normal.         Judgment: Judgment normal.         ED Course                               Results for orders placed or performed during the hospital encounter of 12/20/21 (from the past 24 hour(s))   CBC with platelets differential    Narrative    The following orders were created for panel order CBC with platelets differential.  Procedure                               Abnormality         Status                     ---------                               -----------         ------                     CBC with platelets and d...[[id number]]  Abnormal            Final result                 Please view results for these tests on the individual orders.   Basic metabolic panel   Result Value Ref Range    Sodium 139 133 - 144 mmol/L    Potassium 3.9 3.4 - 5.3 mmol/L    Chloride 108 94 - 109 mmol/L    Carbon Dioxide (CO2) 27 20 - 32 mmol/L    Anion Gap 4 3 - 14 mmol/L    Urea Nitrogen 16 7 - 30 mg/dL    Creatinine 0.99 0.66 - 1.25 mg/dL    Calcium 8.7 8.5 - 10.1 mg/dL    Glucose 92 70 - 99 mg/dL    GFR Estimate 70 >60 mL/min/1.73m2   Nt probnp inpatient (BNP)   Result Value Ref Range    N terminal Pro BNP Inpatient 188 0-1,800 pg/mL   CBC with platelets and differential   Result Value Ref Range    WBC Count 7.9 4.0 - 11.0 10e3/uL    RBC Count 4.63 4.40 - 5.90 10e6/uL    Hemoglobin 14.3 13.3 - 17.7 g/dL    Hematocrit 42.4 40.0 - 53.0 %    MCV 92 78 - 100 fL    MCH 30.9 26.5 - 33.0 pg    MCHC 33.7 31.5 - 36.5 g/dL    RDW 13.6 10.0 - 15.0 %    Platelet Count 117 (L) 150 - 450 10e3/uL    % Neutrophils 69 %    % Lymphocytes 21 %    % Monocytes 8 %    % Eosinophils 1 %    % Basophils 1 %    % Immature Granulocytes 0 %    NRBCs per 100 WBC 0 <1 /100    Absolute Neutrophils 5.5 1.6 - 8.3 10e3/uL    Absolute Lymphocytes 1.7 0.8 - 5.3 10e3/uL    Absolute Monocytes 0.6 0.0 - 1.3 10e3/uL     Absolute Eosinophils 0.1 0.0 - 0.7 10e3/uL    Absolute Basophils 0.1 0.0 - 0.2 10e3/uL    Absolute Immature Granulocytes 0.0 <=0.4 10e3/uL    Absolute NRBCs 0.0 10e3/uL   XR Pelvis including Hip Left 2-3 Views    Narrative    XR PELVIS AND HIP LEFT 2 VIEWS, 12/20/2021 8:15 PM    History: Male, age 86 years; pain, fall    Comparison: None.    Technique: Single view the pelvis and two views of the left hip were  obtained.    FINDINGS: The bones are normally mineralized. The bony pelvis and  visualized portions of the hip appear grossly intact. No evidence of  acute fracture or acute dislocation.      Impression    IMPRESSION:   No distinct evidence of acute or subacute bony abnormality.     Mild bilateral hip joint degenerative change. Severe degenerative  changes seen in the lower lumbar spine    KAT PENA MD         SYSTEM ID:  [id number]   UA with Microscopic reflex to Culture    Specimen: Urine, Clean Catch   Result Value Ref Range    Color Urine Light Yellow Colorless, Straw, Light Yellow, Yellow    Appearance Urine Clear Clear    Glucose Urine Negative Negative mg/dL    Bilirubin Urine Negative Negative    Ketones Urine 10  (A) Negative mg/dL    Specific Gravity Urine 1.020 1.003 - 1.035    Blood Urine Small (A) Negative    pH Urine 6.5 4.7 - 8.0    Protein Albumin Urine Negative Negative mg/dL    Urobilinogen Urine Normal Normal, 2.0 mg/dL    Nitrite Urine Negative Negative    Leukocyte Esterase Urine Negative Negative    Mucus Urine Present (A) None Seen /LPF    RBC Urine 3 (H) <=2 /HPF    WBC Urine 1 <=5 /HPF    Squamous Epithelials Urine 0 <=1 /HPF    Hyaline Casts Urine 5 (H) <=2 /LPF    Narrative    Urine Culture not indicated       Medications - No data to display    Assessments & Plan (with Medical Decision Making)     I have reviewed the nursing notes.    I have reviewed the findings, diagnosis, plan and need for follow up with the patient.  I reviewed imagine results negative for fracture.   I  referred patient for in house PT for strengthening  Patient instructed to use walker at all times.     New Prescriptions    No medications on file       Final diagnoses:   Hip pain, left       12/20/2021   [facility]     Michelle Ugarte APRN CNP  12/22/21 022

## 2021-12-22 ENCOUNTER — APPOINTMENT (OUTPATIENT)
Dept: CT IMAGING | Facility: HOSPITAL | Age: 86
End: 2021-12-22
Attending: PHYSICIAN ASSISTANT
Payer: MEDICARE

## 2021-12-22 ENCOUNTER — HOSPITAL ENCOUNTER (EMERGENCY)
Facility: HOSPITAL | Age: 86
Discharge: NURSING, REHAB OR LONG-TERM CARE | End: 2021-12-23
Admitting: PHYSICIAN ASSISTANT
Payer: MEDICARE

## 2021-12-22 ENCOUNTER — APPOINTMENT (OUTPATIENT)
Dept: GENERAL RADIOLOGY | Facility: HOSPITAL | Age: 86
End: 2021-12-22
Attending: PHYSICIAN ASSISTANT
Payer: MEDICARE

## 2021-12-22 DIAGNOSIS — M25.552 HIP PAIN, LEFT: Primary | ICD-10-CM

## 2021-12-22 PROCEDURE — 250N000013 HC RX MED GY IP 250 OP 250 PS 637

## 2021-12-22 PROCEDURE — 99284 EMERGENCY DEPT VISIT MOD MDM: CPT | Performed by: PHYSICIAN ASSISTANT

## 2021-12-22 PROCEDURE — 99285 EMERGENCY DEPT VISIT HI MDM: CPT | Mod: 25

## 2021-12-22 PROCEDURE — C9803 HOPD COVID-19 SPEC COLLECT: HCPCS

## 2021-12-22 PROCEDURE — 71045 X-RAY EXAM CHEST 1 VIEW: CPT

## 2021-12-22 PROCEDURE — 250N000013 HC RX MED GY IP 250 OP 250 PS 637: Performed by: EMERGENCY MEDICINE

## 2021-12-22 PROCEDURE — 73700 CT LOWER EXTREMITY W/O DYE: CPT | Mod: LT

## 2021-12-22 RX ORDER — FINASTERIDE 5 MG/1
TABLET, FILM COATED ORAL
COMMUNITY
Start: 2021-12-14

## 2021-12-22 RX ORDER — FINASTERIDE 5 MG/1
TABLET, FILM COATED ORAL
Status: COMPLETED
Start: 2021-12-22 | End: 2021-12-22

## 2021-12-22 RX ORDER — TAMSULOSIN HYDROCHLORIDE 0.4 MG/1
0.4 CAPSULE ORAL DAILY
Status: DISCONTINUED | OUTPATIENT
Start: 2021-12-22 | End: 2021-12-23 | Stop reason: HOSPADM

## 2021-12-22 RX ORDER — ACETAMINOPHEN 500 MG
1000 TABLET ORAL EVERY 6 HOURS PRN
COMMUNITY

## 2021-12-22 RX ORDER — FINASTERIDE 5 MG/1
5 TABLET, FILM COATED ORAL DAILY
Status: DISCONTINUED | OUTPATIENT
Start: 2021-12-22 | End: 2021-12-23 | Stop reason: HOSPADM

## 2021-12-22 RX ORDER — TAMSULOSIN HYDROCHLORIDE 0.4 MG/1
CAPSULE ORAL
Status: COMPLETED
Start: 2021-12-22 | End: 2021-12-22

## 2021-12-22 RX ORDER — ASPIRIN 81 MG/1
81 TABLET, CHEWABLE ORAL ONCE
Status: COMPLETED | OUTPATIENT
Start: 2021-12-22 | End: 2021-12-22

## 2021-12-22 RX ADMIN — FINASTERIDE 5 MG: 5 TABLET, FILM COATED ORAL at 23:40

## 2021-12-22 RX ADMIN — TAMSULOSIN HYDROCHLORIDE 0.4 MG: 0.4 CAPSULE ORAL at 23:40

## 2021-12-22 RX ADMIN — ASPIRIN 81 MG CHEWABLE TABLET 81 MG: 81 TABLET CHEWABLE at 23:38

## 2021-12-22 NOTE — ED PROVIDER NOTES
History     Chief Complaint   Patient presents with     Generalized Weakness     HPI  Gino Donaldson is a 86 year old male who arrives to emergency department with weakness specifically into his left hip.  He has a history of high cholesterol, tobacco use, pneumonia, thrombocytopenia and BPH.  He was recently seen in the hospital here 2 days ago after suffering a fall.  At that point he did a x-ray of his left hip as well as a CBC BMP and a BNP and a UA.  These were all reassuring.  However he still has difficulty walking and has been falling multiple times.  He was sent home with a PT at home eval and treatment which she has not received yet.  His children live in the [facility] and they are not able to help him.  He also complains of some upper chest pain as he is fallen and struck his left shoulder and upper chest.  He took Tylenol this morning for pain he rates it at 5 out of 10.    Allergies:  No Known Allergies    Problem List:    Patient Active Problem List    Diagnosis Date Noted     Benign prostatic hyperplasia, presence of lower urinary tract symptoms unspecified 07/11/2017     Priority: Medium     ACP (advance care planning) 05/02/2017     Priority: Medium     Advance Care Planning 5/2/2017: ACP Review of Chart / Resources Provided:  Reviewed chart for advance care plan.  Gino Donaldson has been provided information and resources to begin or update their advance care plan.  Added by Chema Watters             Thrombocytopenia (H) 03/20/2017     Priority: Medium     Pneumonia of both lower lobes due to infectious organism 02/06/2017     Priority: Medium     Hypercholesterolemia      Priority: Medium     Diagnosis updated by automated process. Provider to review and confirm.       Tobacco abuse, in remission      Priority: Medium     Nocturia      Priority: Medium        Past Medical History:    Past Medical History:   Diagnosis Date     Hypercholesteraemia      Nocturia      Tobacco abuse, in  remission        Past Surgical History:    No past surgical history on file.    Family History:    Family History   Problem Relation Age of Onset     Cancer Father         kidney     Cancer Mother         ovarian       Social History:  Marital Status:   [2]  Social History     Tobacco Use     Smoking status: Former Smoker     Types: Cigarettes     Smokeless tobacco: Never Used   Substance Use Topics     Alcohol use: Yes     Drug use: No        Medications:    acetaminophen (TYLENOL) 500 MG tablet  ASPIRIN ADULT LOW STRENGTH PO  finasteride (PROSCAR) 5 MG tablet  tamsulosin (FLOMAX) 0.4 MG capsule          Review of Systems patient denies chills or sweats, body aches, headache, blurry vision or double vision, runny nose or sore throat, difficulty swallowing, he states that he often deals with shortness of breath and a cough from smoking and it is not new.  He states he has chest pressure when he touches his chest there is some bruising in this area.  He denies any palpitations, he denies any abdominal pain, nausea, vomiting, or changes in bowel or bladder habits.  He has not had any urinary retention or urinary incontinence.  Not have any paresthesias in his extremities.    Physical Exam   BP: 154/84  Pulse: 65  Temp: 97.3  F (36.3  C)  Resp: 16  SpO2: 98 %      Physical Exam  General: alert, oriented to person, place, time  Head: Slight abrasion which is healing to his occipital portion of his top head.  Eyes: PERRL, corneas clear and conjunctivae clear  Nose: no rhinorrhea/nasal discharge  Neck: no tenderness, supple and no adenopathy no step-offs  Chest/Pulmonary: chest clear with equal lung sounds bilaterally, chest wall tenderness upper left portion of his chest there is bruising here, there are no deformities, no tachypnea and no cyanosis  Cardiovascular: S1, S2 normal, regular rate and rhythm, no murmur and no pedal edema  Abdomen: soft, non-tender, no guarding, no rebound tenderness  Back/Spine:  No  tenderness, no step-offs, no obvious deformities.  Musculoskeletal/Extremities: Tenderness to left greater trochanter area.  Patient has 5 out of 5 strength in all his extremities.  Skin: no diaphoresis and skin color normal  Neuro: speech clear, nerves II through XII are grossly intact patient is negative for [place] stroke scale, patient can plantarflex and dorsiflex against resistance, patient can lift right leg and hold for greater than 5 seconds he can also do this on his left leg.  Psychiatric: affect/mood normal, cooperative, normal judgement/insight and memory intact    ED Course      MDM: Differential diagnosis includes was not limited to:: Occult hip fracture, rib fractures, pelvic fracture, soft tissue swelling, osteoarthritis, septic arthritis, and deconditioning.    As the area is not hot to the touch he has no fever and is not tachycardic I am less concerned of septic arthritis at this time.  He did have a left hip x-ray 2 days ago which showed no fractures although he is having difficulty taking more than 1-2 steps I am concerned about occult hip fracture at this time he also has some bruising to his chest and some tenderness of this makes me concerned of rib fractures as well as a small pneumothorax.  At this time will obtain a CT of left hip and chest x-ray.        ED Course as of 12/22/21 2300   Wed Dec 22, 2021   1814 XR Chest Port 1 View                                                                   IMPRESSION:       No acute cardiopulmonary process.     1838 CT Hip Left w/o Contrast  Impression:     No acute fracture or dislocation.    1902 Is able to see PT here in the ER at 07 100 tomorrow morning.  He is in agreement to stay overnight.            No results found for this or any previous visit (from the past 24 hour(s)).    Medications - No data to display    Assessments & Plan (with Medical Decision Making)     I have reviewed the nursing notes.    I have reviewed the findings,  diagnosis, plan and need for follow up with the patient.    New Prescriptions    No medications on file       Final diagnoses:   None       12/22/2021   [facility]     Jamarcus Waddell PA-C  12/22/21 1801       Jamarcus Waddell PA-C  12/22/21 0116

## 2021-12-22 NOTE — ED TRIAGE NOTES
"Pt states over the last week he has been falling. \"I am walking with my walker and then my left leg goes numb and I fall\". Pt states he lives by himself and does not feel safe going home. \"I fell twice today\". Pt denies hitting head. Denies and neck pain, denies any blood thinners.  " I have seen and examined the patient, and progress note is dictated. Case d/w Dr. Mari.

## 2021-12-22 NOTE — ED NOTES
Patient reports falling on Monday and then again today. Patient reports that his left hip gives out on him causing his falls. Patient reports pain in left hip with no pain at rest, but with pressure pain goes to a 8/10 and he describes pain achey. Bruising noted left anterior chest patient reports from previous fall.

## 2021-12-22 NOTE — ED NOTES
"Pt states he has not started in house PT yet \"but there has to be something else that is wrong\", \"my daughter insists that I stay here until they find out what is wrong\". Daughter lives in [place]. Pt states son \"lives down there too\".  "

## 2021-12-22 NOTE — PROGRESS NOTES
Care Transitions focused note:      Spoke with patient and his daughter on speaker phone before patient got brought back to a treatment room.  This patient has fallen 5 or more times in the last 36 -48 hours.  Patient lives alone at [facility] apartments.  Has just recently started using a walker.  Had been driving up to last week.  Observed significant tremors in patients left arm and some in his right.  Daughter states this is new.  Patient states he had slight but now it has increased significantly.  Daughter states he is not safe to return home.    Daughter lives in the [facility] area.  We discussed possible options, explaining that this patient had not been seen as of yet.  1).  Patient gets evaluated and something is discovered and he might need inpatient care.  This may not happen in [place], depending on bed availability. 2). Patient is cleared from a medical standpoint and does not need hospitalization.  Then the issue becomes what to do?  A SNF and assisted living need money up front, which the daughter and patient state he does not have.  Patient chimed up that maybe he could go to daughter's house.  Daughter states that is not a option for the long term.      We discussed the Covid exemption and IF patient had a PT Evaluation and IF it noted patient would benefit from a SNF placement, then medicare could pay for that stay.  Daughter updated that nursing home beds are hard to come by and may not be available.      Told daughter we would discuss options after evaluation.

## 2021-12-23 ENCOUNTER — APPOINTMENT (OUTPATIENT)
Dept: PHYSICAL THERAPY | Facility: HOSPITAL | Age: 86
End: 2021-12-23
Attending: PHYSICIAN ASSISTANT
Payer: MEDICARE

## 2021-12-23 VITALS
WEIGHT: 195 LBS | SYSTOLIC BLOOD PRESSURE: 129 MMHG | BODY MASS INDEX: 27.2 KG/M2 | TEMPERATURE: 100.1 F | HEART RATE: 86 BPM | OXYGEN SATURATION: 94 % | DIASTOLIC BLOOD PRESSURE: 83 MMHG | RESPIRATION RATE: 16 BRPM

## 2021-12-23 LAB — SARS-COV-2 RNA RESP QL NAA+PROBE: NEGATIVE

## 2021-12-23 PROCEDURE — U0003 INFECTIOUS AGENT DETECTION BY NUCLEIC ACID (DNA OR RNA); SEVERE ACUTE RESPIRATORY SYNDROME CORONAVIRUS 2 (SARS-COV-2) (CORONAVIRUS DISEASE [COVID-19]), AMPLIFIED PROBE TECHNIQUE, MAKING USE OF HIGH THROUGHPUT TECHNOLOGIES AS DESCRIBED BY CMS-2020-01-R: HCPCS | Performed by: EMERGENCY MEDICINE

## 2021-12-23 PROCEDURE — 97161 PT EVAL LOW COMPLEX 20 MIN: CPT | Mod: GP

## 2021-12-23 PROCEDURE — 250N000013 HC RX MED GY IP 250 OP 250 PS 637: Performed by: EMERGENCY MEDICINE

## 2021-12-23 RX ORDER — ACETAMINOPHEN 325 MG/1
325 TABLET ORAL ONCE
Status: DISCONTINUED | OUTPATIENT
Start: 2021-12-23 | End: 2021-12-23

## 2021-12-23 RX ORDER — ACETAMINOPHEN 325 MG/1
650 TABLET ORAL ONCE
Status: COMPLETED | OUTPATIENT
Start: 2021-12-23 | End: 2021-12-23

## 2021-12-23 RX ADMIN — ACETAMINOPHEN 650 MG: 325 TABLET, FILM COATED ORAL at 11:18

## 2021-12-23 NOTE — ED NOTES
Pt was readjusted in be. Pt was positioned to eat breakfast. Call light within reach and nursing staff aware.

## 2021-12-23 NOTE — ED NOTES
Care Transitions focused note:      Chart reviewed, patient is known to me.  Here with generalized weakness and multiple falls at home.  He lives at [facility] alone. Patient has been evaluated by PT and they feel that he would benefit from therapy in a snf.  Spoke with patient's daughter Ellyn and she is in agreement with this plan.  I also spoke to patient who is alert and oriented.  He is also in agreement with placement.    Dtr lives in the [facility] and is willing to [place]    I have placed calls to [facility] as well as [facility], [facility] and [facility].      I will actively search for placement.      KAYE Weldon

## 2021-12-23 NOTE — ED NOTES
Pt was attempting to get out of bed with staff assistance. Staff noted pt was unable to sit up without difficulty. Pt stated I need to go to the bathroom. Pt stated its starting to come out. Staff encouraged pt to continue the BM and staff would clean him. Pt had a large BM. Pt clothing and bedding was changed. Pt was re adjusted into the bed. Primary nurse was notified of incident. Decide commde removed from pt room and pt was encouraged to use bedpan and/or notify staff if a brief change is needed.

## 2021-12-23 NOTE — PROGRESS NOTES
12/23/21 0800   Signing Clinician's Name / Credentials   Signing clinician's name / credentials DORINA Chou (Tom) Adds   Rehab Discipline PT   PT Discharge Planning    PT Discharge Recommendation (DC Rec) Transitional Care Facility   PT Rationale for DC Rec Clinical Reasoning   PT Brief overview of current status  Pt supine and awake in bed upon PT arrival. Pt vitally stable on room air (SpO2 95%). Pt denies pain at rest, however states that his low back/pelvic/hip area incurs pain of 6-8/10 with certain movements. Pt supine>sit with MOD-I for bedrail use, CGA for sit<>stand and pivot transfer (MIN-A from low chair). Pt unable to walk more than 3 steps at this time due to balance and LE weakness concerns. Beckham balance assessment yields a score of 24 This score is associated with almost 100% fall risk while the patient at the moment may be requiring assistance in performing certain activities of daily living such as walking. Pt currently lives in an apartment by himself with no stairs to enter and functioning elevators in building. Pt's family is involved, but live 3+ hours away. Pt states that he has a support system that is able to check on him regularly, however they are not available for supervision. Pt is agreeable to the idea of going to a short term rehabilitation facility in order to decrease his fall risk through skilled therapy services. Pt in room, lying in bed with call light and telephone in reach.   Total Session Time   Total Session Time (minutes) 25 minutes

## 2021-12-23 NOTE — PROGRESS NOTES
Inpatient Physical Therapy Evaluation    Name: Gino Donaldson MRN# [id number]   Age: 86 year old YOB: 1935     Date of Consultation: 2021  Consultation is requested by:  Jamarcus Waddell PA-C  Specific Consultation Request:  Evaluation for fall risk  Primary care provider: Juancho Patton    General Information:   Onset Date: 2021    History of Current Problem/Admission: No admission diagnoses are documented for this encounter.    Prior Level of Function: MOD-I with FWW for ADLs, Driving self until 2 weeks ago  Ambulation: 1 - Assistive Equipment   Transferrin - Assistive Equipment   Toiletin - Assistive Equipment    Bathin - Assistive Equipment   Dressin - Independent   Eatin - Independent   Communication: 0 - Understands/communicates without difficulty  Swallowin - swallows foods/liquids without difficulty  Cognition: 0 - no cognitive issues reported    Fall history within the last 6 months: Yes    Current Living Situation: Patient has 0 stairs to enter the home. Living at [facility]. Elevator accessible    Current Equipment Used at Home: FWW and hand holds placed in BR.      Patient & Family Goals: Reduced fall risk and increase in functional capacity during ambulation     Past Medical History:   Past Medical History:   Diagnosis Date     Hypercholesteraemia      Nocturia      Tobacco abuse, in remission        Past Surgical History:  No past surgical history on file.    Medications:   Current Facility-Administered Medications   Medication     finasteride (PROSCAR) tablet 5 mg     tamsulosin (FLOMAX) capsule 0.4 mg     Current Outpatient Medications   Medication Sig     acetaminophen (TYLENOL) 500 MG tablet Take 1,000 mg by mouth every 6 hours as needed for mild pain     ASPIRIN ADULT LOW STRENGTH PO Take 81 mg by mouth     finasteride (PROSCAR) 5 MG tablet      tamsulosin (FLOMAX) 0.4 MG capsule        Weight Bearing Status: WBAT, no  fracture indicated on current imaging    Imaging:  Exam: CT HIP LEFT W/O CONTRAST     Exam reason: Hip pain, stress fracture suspected, neg xray     Technique: Using helical CT technique, axial images are obtained  through the left hip. Coronal and sagittal reformats were performed.  No intravenous contrast was utilized.     Comparison: Pelvis radiograph on 2021     Findings:     No acute fracture or dislocation.      There is mild degenerative change at the left hip. Mild degenerative  changes of the left SI joint.     Alignment is anatomic.      Visualized small bowel is not dilated. There is diverticulosis of the  sigmoid colon without evidence of acute diverticulitis.                                                                      Impression:     No acute fracture or dislocation.      JUANPABLO CORONA MD      Cognitive Status Examination:  Orientation: awake and alert  Level of Consciousness: alert  Follows Commands and Answers Questions: 100% of the time and able to follow multistep commands  Personal Safety and Judgement: Intact  Memory: Immediate recall intact, Short-term memory intact and Long-term memory intact    Pain:   Currently in pain? Yes  Pain Location? low back  Pain Ratin (when moving)    Physical Therapy Evaluation:   Integumentary/Edema: Bruising consistent with recent falls  ROM: L UE and LE limitations> R  Strength: Impaired L LE hip and knee flexion  Bed Mobility: MOD-I  Transfers: CGA>MIN-Ax1  Gait: Unable to assess. Short shuffled steps during transfers. Minimal WB on L LE  Stairs: NT  Balance: 24 points on ZAMORA, This score is associated with almost 100% fall risk while the patient at the moment may be requiring assistance in performing certain activities of daily living such as walking.  Sensory: Grossly intact  Coordination: Grossly intact    Physical Therapy Interventions: Balance, Bed Mobility, Strengthening, Transfer Training and Progressive Activity/Exercise     Clinical  Impressions:  Criteria for Skilled Therapeutic Intervention Met: Yes, treatment indicated  PT Diagnosis: LE weakness and proprioceptive balance deficits  Influenced by the following impairments: L LE weakness due to pain  Functional limitations due to impairment: Limitation to home and leisure roles  Clinical presentation: Evolving/Changing  Clinical presentation rationale: Clinical reasoning in the present of recent decline in condition and multiple falls in the past two weeks  Clinical Decision making (complexity): Low Complexity  Frequency: Daily  Predicted Duration of Therapy Intervention (days/wks): 5 days  Anticipated Discharge Disposition: Transitional Care Facility   Anticipated Equipment Needs at Discharge: front wheeled walker  Risks and Benefits of therapy have been explained: Yes  Patient, Family & other staff in agreement with plan of care: Yes  Clinical Impression Comments: Pt supine and awake in bed upon PT arrival. Pt vitally stable on room air (SpO2 95%). Pt denies pain at rest, however states that his low back/pelvic/hip area incurs pain of 6-8/10 with certain movements. Pt supine>sit with MOD-I for bedrail use, CGA for sit<>stand and pivot transfer (MIN-A from low chair). Pt unable to walk more than 3 steps at this time due to balance and LE weakness concerns. Beckham balance assessment yields a score of 24 This score is associated with almost 100% fall risk while the patient at the moment may be requiring assistance in performing certain activities of daily living such as walking. Pt currently lives in an apartment by himself with no stairs to enter and functioning elevators in building. Pt's family is involved, but live 3+ hours away. Pt states that he has a support system that is able to check on him regularly, however they are not available for supervision. Pt is agreeable to the idea of going to a short term rehabilitation facility in order to decrease his fall risk through skilled therapy  services. Pt in room, lying in bed with call light and telephone in reach.    Total Eval Time: 25 mins

## 2021-12-23 NOTE — ED NOTES
Call back from [facility], they are not taking admissions at this time due to staffing shortage.      Talked to Taty at [facility].  She is unsure if they will have staff to do an admission and will get back to me.    Brooke from [facility] called and they will screen.  I will send the screen over with the patient.    KAYE Weldon

## 2021-12-23 NOTE — ED NOTES
Patient has been accepted to [facility].  PAS was completed and will be sent with the patient. Appreciate Dr Cat, hospitalist for kindly completing the admission orders. Will contact [facility] [employer] once orders are routed.    KAYE Weldon

## 2022-01-07 ENCOUNTER — MEDICAL CORRESPONDENCE (OUTPATIENT)
Dept: MRI IMAGING | Facility: HOSPITAL | Age: 87
End: 2022-01-07
Payer: COMMERCIAL

## 2022-01-12 ENCOUNTER — HOSPITAL ENCOUNTER (OUTPATIENT)
Dept: MRI IMAGING | Facility: HOSPITAL | Age: 87
End: 2022-01-12
Attending: RADIOLOGY
Payer: MEDICARE

## 2022-01-12 ENCOUNTER — HOSPITAL ENCOUNTER (EMERGENCY)
Facility: HOSPITAL | Age: 87
Discharge: ANOTHER HOSPITAL | End: 2022-01-12
Attending: STUDENT IN AN ORGANIZED HEALTH CARE EDUCATION/TRAINING PROGRAM | Admitting: STUDENT IN AN ORGANIZED HEALTH CARE EDUCATION/TRAINING PROGRAM
Payer: MEDICARE

## 2022-01-12 ENCOUNTER — HOSPITAL ENCOUNTER (OUTPATIENT)
Dept: MRI IMAGING | Facility: HOSPITAL | Age: 87
End: 2022-01-12
Attending: FAMILY MEDICINE
Payer: MEDICARE

## 2022-01-12 ENCOUNTER — DOCUMENTATION ONLY (OUTPATIENT)
Dept: CARE COORDINATION | Facility: OTHER | Age: 87
End: 2022-01-12

## 2022-01-12 VITALS
RESPIRATION RATE: 13 BRPM | TEMPERATURE: 99.2 F | OXYGEN SATURATION: 94 % | SYSTOLIC BLOOD PRESSURE: 159 MMHG | DIASTOLIC BLOOD PRESSURE: 89 MMHG | HEART RATE: 71 BPM

## 2022-01-12 DIAGNOSIS — R53.1 LEFT-SIDED WEAKNESS: ICD-10-CM

## 2022-01-12 DIAGNOSIS — S06.5XAA SUBDURAL HEMATOMA (H): ICD-10-CM

## 2022-01-12 DIAGNOSIS — R29.6 REPEATED FALLS: ICD-10-CM

## 2022-01-12 DIAGNOSIS — M62.81 GENERALIZED MUSCLE WEAKNESS: ICD-10-CM

## 2022-01-12 DIAGNOSIS — R29.6 FALLS FREQUENTLY: ICD-10-CM

## 2022-01-12 LAB
ABO/RH(D): NORMAL
ANION GAP SERPL CALCULATED.3IONS-SCNC: 3 MMOL/L (ref 3–14)
ANTIBODY SCREEN: NEGATIVE
BUN SERPL-MCNC: 19 MG/DL (ref 7–30)
CALCIUM SERPL-MCNC: 9.3 MG/DL (ref 8.5–10.1)
CHLORIDE BLD-SCNC: 103 MMOL/L (ref 94–109)
CO2 SERPL-SCNC: 28 MMOL/L (ref 20–32)
CREAT SERPL-MCNC: 0.96 MG/DL (ref 0.66–1.25)
ERYTHROCYTE [DISTWIDTH] IN BLOOD BY AUTOMATED COUNT: 13 % (ref 10–15)
GFR SERPL CREATININE-BSD FRML MDRD: 78 ML/MIN/1.73M2
GLUCOSE BLD-MCNC: 106 MG/DL (ref 70–99)
HCT VFR BLD AUTO: 45.5 % (ref 40–53)
HGB BLD-MCNC: 15.4 G/DL (ref 13.3–17.7)
HOLD SPECIMEN: NORMAL
INR PPP: 0.96 (ref 0.85–1.15)
MCH RBC QN AUTO: 30.8 PG (ref 26.5–33)
MCHC RBC AUTO-ENTMCNC: 33.8 G/DL (ref 31.5–36.5)
MCV RBC AUTO: 91 FL (ref 78–100)
PLATELET # BLD AUTO: 109 10E3/UL (ref 150–450)
POTASSIUM BLD-SCNC: 4.2 MMOL/L (ref 3.4–5.3)
RADIOLOGIST FLAGS: ABNORMAL
RADIOLOGIST FLAGS: ABNORMAL
RBC # BLD AUTO: 5 10E6/UL (ref 4.4–5.9)
SARS-COV-2 RNA RESP QL NAA+PROBE: NEGATIVE
SODIUM SERPL-SCNC: 134 MMOL/L (ref 133–144)
SPECIMEN EXPIRATION DATE: NORMAL
WBC # BLD AUTO: 7.5 10E3/UL (ref 4–11)

## 2022-01-12 PROCEDURE — 70544 MR ANGIOGRAPHY HEAD W/O DYE: CPT | Mod: 59

## 2022-01-12 PROCEDURE — 99285 EMERGENCY DEPT VISIT HI MDM: CPT | Mod: 25

## 2022-01-12 PROCEDURE — C9803 HOPD COVID-19 SPEC COLLECT: HCPCS

## 2022-01-12 PROCEDURE — U0005 INFEC AGEN DETEC AMPLI PROBE: HCPCS | Performed by: STUDENT IN AN ORGANIZED HEALTH CARE EDUCATION/TRAINING PROGRAM

## 2022-01-12 PROCEDURE — 80048 BASIC METABOLIC PNL TOTAL CA: CPT | Performed by: STUDENT IN AN ORGANIZED HEALTH CARE EDUCATION/TRAINING PROGRAM

## 2022-01-12 PROCEDURE — 70551 MRI BRAIN STEM W/O DYE: CPT

## 2022-01-12 PROCEDURE — 86901 BLOOD TYPING SEROLOGIC RH(D): CPT | Performed by: STUDENT IN AN ORGANIZED HEALTH CARE EDUCATION/TRAINING PROGRAM

## 2022-01-12 PROCEDURE — 99285 EMERGENCY DEPT VISIT HI MDM: CPT | Performed by: STUDENT IN AN ORGANIZED HEALTH CARE EDUCATION/TRAINING PROGRAM

## 2022-01-12 PROCEDURE — 85610 PROTHROMBIN TIME: CPT | Performed by: STUDENT IN AN ORGANIZED HEALTH CARE EDUCATION/TRAINING PROGRAM

## 2022-01-12 PROCEDURE — 85027 COMPLETE CBC AUTOMATED: CPT | Performed by: STUDENT IN AN ORGANIZED HEALTH CARE EDUCATION/TRAINING PROGRAM

## 2022-01-12 PROCEDURE — 36415 COLL VENOUS BLD VENIPUNCTURE: CPT | Performed by: STUDENT IN AN ORGANIZED HEALTH CARE EDUCATION/TRAINING PROGRAM

## 2022-01-12 NOTE — ED PROVIDER NOTES
History     Chief Complaint   Patient presents with     Abnormal MRI per family     HPI  Gino Donaldson is a 86 year old male with a past medical history of hypercholesterolemia and benign prostatic hyperplasia presenting with 3 weeks of left-sided weakness and frequent falls.  He had an MRI ordered by his transitional care unit and at the MRI he was noted to have a subdural hematoma.  The radiologist then sent him to the emergency department.  He has no new acute findings today.  He presents with his daughter.  He denies a headache/nausea/vomiting.  He does endorse new left-sided weakness over the past 3 weeks but no acute changes today.  Denies any fevers or chills.  He was seen here several weeks ago and had hip and chest imaging but did not have any apparent head imaging as there was no noted injury to the head or neck.    Allergies:  No Known Allergies    Problem List:    Patient Active Problem List    Diagnosis Date Noted     Benign prostatic hyperplasia, presence of lower urinary tract symptoms unspecified 07/11/2017     Priority: Medium     ACP (advance care planning) 05/02/2017     Priority: Medium     Advance Care Planning 5/2/2017: ACP Review of Chart / Resources Provided:  Reviewed chart for advance care plan.  Gino Donaldson has been provided information and resources to begin or update their advance care plan.  Added by Chema Watters             Thrombocytopenia (H) 03/20/2017     Priority: Medium     Pneumonia of both lower lobes due to infectious organism 02/06/2017     Priority: Medium     Hypercholesterolemia      Priority: Medium     Diagnosis updated by automated process. Provider to review and confirm.       Tobacco abuse, in remission      Priority: Medium     Nocturia      Priority: Medium        Past Medical History:    Past Medical History:   Diagnosis Date     Hypercholesteraemia      Nocturia      Tobacco abuse, in remission        Past Surgical History:    No past surgical  history on file.    Family History:    Family History   Problem Relation Age of Onset     Cancer Father         kidney     Cancer Mother         ovarian       Social History:  Marital Status:   [2]  Social History     Tobacco Use     Smoking status: Former Smoker     Types: Cigarettes     Smokeless tobacco: Never Used   Substance Use Topics     Alcohol use: Yes     Drug use: No        Medications:    ASPIRIN ADULT LOW STRENGTH PO  tamsulosin (FLOMAX) 0.4 MG capsule  acetaminophen (TYLENOL) 500 MG tablet  finasteride (PROSCAR) 5 MG tablet          Review of Systems   All other systems reviewed and are negative.      Physical Exam   BP: 152/86  Pulse: 77  Temp: 98.8  F (37.1  C)  Resp: 16  SpO2: 92 %      Physical Exam  Vitals and nursing note reviewed.   Constitutional:       Appearance: Normal appearance.   HENT:      Head: Normocephalic and atraumatic.      Right Ear: External ear normal.      Left Ear: External ear normal.      Nose: Nose normal.      Mouth/Throat:      Mouth: Mucous membranes are moist.      Pharynx: Oropharynx is clear.   Eyes:      Extraocular Movements: Extraocular movements intact.      Pupils: Pupils are equal, round, and reactive to light.   Cardiovascular:      Rate and Rhythm: Normal rate and regular rhythm.      Pulses: Normal pulses.      Heart sounds: Normal heart sounds.   Pulmonary:      Effort: Pulmonary effort is normal.      Breath sounds: Normal breath sounds.   Abdominal:      General: Abdomen is flat.      Palpations: Abdomen is soft.   Musculoskeletal:         General: Normal range of motion.      Cervical back: Normal range of motion.   Skin:     General: Skin is warm and dry.      Capillary Refill: Capillary refill takes less than 2 seconds.   Neurological:      Mental Status: He is alert and oriented to person, place, and time.      Comments: Diffuse weakness on left   Psychiatric:         Mood and Affect: Mood normal.         Behavior: Behavior normal.         ED  Course              ED Course as of 01/12/22 1618   Wed Jan 12, 2022   1536 Spoke with Dr. Alamo of neurosurgery at [facility].   1541 Dr. Ambrose of trauma surgery also involved.   1618 Patient accepted at [facility]. Remained in stable condition while here.     Procedures              Results for orders placed or performed during the hospital encounter of 01/12/22 (from the past 24 hour(s))   CBC with platelets   Result Value Ref Range    WBC Count 7.5 4.0 - 11.0 10e3/uL    RBC Count 5.00 4.40 - 5.90 10e6/uL    Hemoglobin 15.4 13.3 - 17.7 g/dL    Hematocrit 45.5 40.0 - 53.0 %    MCV 91 78 - 100 fL    MCH 30.8 26.5 - 33.0 pg    MCHC 33.8 31.5 - 36.5 g/dL    RDW 13.0 10.0 - 15.0 %    Platelet Count 109 (L) 150 - 450 10e3/uL   ABO/Rh type and screen    Narrative    The following orders were created for panel order ABO/Rh type and screen.  Procedure                               Abnormality         Status                     ---------                               -----------         ------                     Adult Type and Screen[[id number]]                            In process                   Please view results for these tests on the individual orders.   [place] Draw    Narrative    The following orders were created for panel order [place] Draw.  Procedure                               Abnormality         Status                     ---------                               -----------         ------                     Extra Red Top Tube[[id number]]                               In process                 Extra Green Top (Lithium...[[id number]]                      In process                 Extra Urine Collection[[id number]]                           In process                   Please view results for these tests on the individual orders.       Medications - No data to display    Assessments & Plan (with Medical Decision Making)     I have reviewed the nursing notes.    Symptomatic subdural hematoma. Unknown if  traumatic but does have recent falls. Sent from MRI after head imaging already performed as scheduled imaging. No new acute features. Plan on discussion with neurosurgery and trauma at [facility] for likely transfer. He is not acutely changing or in need of emergent treatment here otherwise.    I have reviewed the findings, diagnosis, plan and need for follow up with the patient.  New Prescriptions    No medications on file       Final diagnoses:   Subdural hematoma (H)   Generalized muscle weakness   Falls frequently       1/12/2022   [facility]     Carlos Vazquez MD  01/12/22 3616

## 2022-01-12 NOTE — PROGRESS NOTES
CC reviewing referral document.  Referral closed by another CC as patient not [facility] patient.  RN CC called Deb  Deepthi to alert her of CC referral placed on 12/20.  Yany Edmondson, VALERIANO  Care Coordination

## 2022-01-12 NOTE — ED NOTES
"Pt states he has not had any recent illness. States he has had decreased strength since before [facility] and currently at [facility] for rehab \"to get my strength back\".  "

## 2022-01-12 NOTE — ED TRIAGE NOTES
"Pt comes from MRI with daughter and they told pt to come to the ED.  Family is unaware of why they need to come here.  Had MRI on \"brain\"  At [facility] for strength.    "

## 2022-03-09 ENCOUNTER — MEDICAL CORRESPONDENCE (OUTPATIENT)
Dept: MRI IMAGING | Facility: HOSPITAL | Age: 87
End: 2022-03-09
Payer: COMMERCIAL

## 2022-03-12 ENCOUNTER — MEDICAL CORRESPONDENCE (OUTPATIENT)
Dept: INTERVENTIONAL RADIOLOGY/VASCULAR | Facility: HOSPITAL | Age: 87
End: 2022-03-12
Payer: COMMERCIAL

## 2022-03-14 ENCOUNTER — HOSPITAL ENCOUNTER (OUTPATIENT)
Dept: MRI IMAGING | Facility: HOSPITAL | Age: 87
Discharge: HOME | End: 2022-03-14
Attending: FAMILY MEDICINE
Payer: MEDICARE

## 2022-03-14 DIAGNOSIS — M51.369 OTHER INTERVERTEBRAL DISC DEGENERATION, LUMBAR REGION: ICD-10-CM

## 2022-03-14 DIAGNOSIS — M25.559 PAIN IN UNSPECIFIED HIP: ICD-10-CM

## 2022-03-14 PROCEDURE — 73721 MRI JNT OF LWR EXTRE W/O DYE: CPT | Mod: LT

## 2022-03-14 PROCEDURE — 72148 MRI LUMBAR SPINE W/O DYE: CPT

## 2022-03-19 ENCOUNTER — HEALTH MAINTENANCE LETTER (OUTPATIENT)
Age: 87
End: 2022-03-19

## 2022-04-05 ENCOUNTER — HOSPITAL ENCOUNTER (OUTPATIENT)
Facility: HOSPITAL | Age: 87
Discharge: HOME | End: 2022-04-05
Attending: RADIOLOGY | Admitting: RADIOLOGY
Payer: MEDICARE

## 2022-04-05 ENCOUNTER — HOSPITAL ENCOUNTER (OUTPATIENT)
Dept: INTERVENTIONAL RADIOLOGY/VASCULAR | Facility: HOSPITAL | Age: 87
Discharge: HOME | End: 2022-04-05
Attending: FAMILY MEDICINE | Admitting: RADIOLOGY
Payer: MEDICARE

## 2022-04-05 DIAGNOSIS — M51.26 OTHER INTERVERTEBRAL DISC DISPLACEMENT, LUMBAR REGION: ICD-10-CM

## 2022-04-05 DIAGNOSIS — M51.16 INTERVERTEBRAL DISC DISORDERS WITH RADICULOPATHY, LUMBAR REGION: ICD-10-CM

## 2022-04-05 PROCEDURE — 250N000011 HC RX IP 250 OP 636: Performed by: RADIOLOGY

## 2022-04-05 PROCEDURE — C1751 CATH, INF, PER/CENT/MIDLINE: HCPCS

## 2022-04-05 RX ORDER — DEXAMETHASONE SODIUM PHOSPHATE 10 MG/ML
10 INJECTION, SOLUTION INTRAMUSCULAR; INTRAVENOUS ONCE
Status: COMPLETED | OUTPATIENT
Start: 2022-04-05 | End: 2022-04-05

## 2022-04-05 RX ORDER — IOPAMIDOL 612 MG/ML
15 INJECTION, SOLUTION INTRATHECAL ONCE
Status: COMPLETED | OUTPATIENT
Start: 2022-04-05 | End: 2022-04-05

## 2022-04-05 RX ADMIN — DEXAMETHASONE SODIUM PHOSPHATE 10 MG: 10 INJECTION, SOLUTION INTRAMUSCULAR; INTRAVENOUS at 09:46

## 2022-04-05 RX ADMIN — IOPAMIDOL 3 ML: 612 INJECTION, SOLUTION INTRATHECAL at 09:46

## 2022-04-05 NOTE — DISCHARGE INSTRUCTIONS
Cell number on file:    Telephone Information:   Mobile [phone]     Is it ok to leave a message at this number(s)? Yes    Dr Schneider completed your procedure on 4/5/2022.    Current Pain Level (0-10 Scale): 4/10  Post Pain Level (0-10):  0/10    Radiology Discharge instructions for Steroid Injection    Activity Level:     Do not do any heavy activity or exercise for 24 hours.   Do not drive for 4 hours after your injection.  Diet:   Return to your normal diet.  Medications:   If you have stopped taking your Aspirin, Coumadin/Warfarin, Ibuprofen, or any   other blood thinner for this procedure you may resume in the morning unless   your primary care provider has given you other instructions.    Diabetics may see an increase in blood sugar after steroid injections. If you are concerned about your blood sugar, please contact your family doctor.    Site Care:  Remove the bandage and bathe or shower the morning after the procedure.      Please allow two weeks to experience improvement in your pain.  If you have any further issues, please contact your provider.    Call your Primary Care Provider if you have the following (if your primary care provider is not available please seek emergency care):   Nausea with vomiting   Severe headache   Drowsiness or confusion   Redness or drainage at the injection or puncture site   Temperature over 101 degrees F   Other concerns   Worsening back pain   Stiff neck

## 2022-04-21 ENCOUNTER — TELEPHONE (OUTPATIENT)
Dept: INTERVENTIONAL RADIOLOGY/VASCULAR | Facility: HOSPITAL | Age: 87
End: 2022-04-21
Payer: COMMERCIAL

## 2022-04-21 NOTE — TELEPHONE ENCOUNTER
INJECTION POST CALL    Procedure: Epidural Left TF L3-4  Radiologist(s): Dr. Matteo Schneider  Date of Procedure:  4/5/22    Relief of pain from this injection    A = 90%  A- = 85%  B = 80%  B- =75%  C = 70%  C- = 65%  D = 60%  D- = 50%  F = less than 50%    Do you feel this injection was beneficial? Yes  If yes, how long did it last? Currently relieving 60-70% of pain much more bearable    Where is the pain located? Left leg, hip pain has resolved  Can you describe the pain? achey     Does the pain radiate anywhere? Yes   If yes, where does the pain stop? Down left leg    Is this new pain? No      The patient had no questions or concerns.    Instruct patient to follow up with their provider for any further care they may need. (as Dr. Hawkins will no longer be making recommendations)    Conchita Guerra

## 2022-09-04 ENCOUNTER — HEALTH MAINTENANCE LETTER (OUTPATIENT)
Age: 87
End: 2022-09-04

## 2023-04-29 ENCOUNTER — HEALTH MAINTENANCE LETTER (OUTPATIENT)
Age: 88
End: 2023-04-29

## 2024-07-06 ENCOUNTER — HEALTH MAINTENANCE LETTER (OUTPATIENT)
Age: 89
End: 2024-07-06